# Patient Record
Sex: FEMALE | Race: OTHER | HISPANIC OR LATINO | Employment: FULL TIME | URBAN - METROPOLITAN AREA
[De-identification: names, ages, dates, MRNs, and addresses within clinical notes are randomized per-mention and may not be internally consistent; named-entity substitution may affect disease eponyms.]

---

## 2018-01-12 ENCOUNTER — OFFICE VISIT (OUTPATIENT)
Dept: URGENT CARE | Facility: MEDICAL CENTER | Age: 38
End: 2018-01-12
Payer: COMMERCIAL

## 2018-01-12 PROCEDURE — 99212 OFFICE O/P EST SF 10 MIN: CPT

## 2018-01-13 NOTE — RESULT NOTES
Verified Results  ([facility]) Pap Lb, HPV-hr [id number] 88:33UA Leatha Wilkinson   Source    Carolann Zafar Cervical  LMP / Prev Treat  Carolann Stephen QPT=[id number]  No  of containers  Carolann Stephen 01 CYTYC Thin Prep Vial     Test Name Result Flag Reference   DIAGNOSIS: Comment     NEGATIVE FOR INTRAEPITHELIAL LESION AND MALIGNANCY  THE CYTOLOGY PROCESSING WAS PERFORMED AT THE [facility] LOCATED AT  [place] [address], [address] [address]  Specimen adequacy: Comment     Satisfactory for evaluation  Endocervical and/or squamous metaplastic  cells (endocervical component) are present  Clinician provided ICD10: Comment     Z01 419   Performed by: Suhail Saha, Cytotechnologist (ASCP)     Carolann Stephen Note: Comment     The Pap smear is a screening test designed to aid in the detection of  premalignant and malignant conditions of the uterine cervix  It is not a  diagnostic procedure and should not be used as the sole means of detecting  cervical cancer  Both false-positive and false-negative reports do occur  HPV, high-risk Negative  Negative   This high-risk HPV test detects thirteen high-risk types  (16/18/31/33/35/39/45/51/52/56/58/59/68) without differentiation

## 2018-01-13 NOTE — PROGRESS NOTES
Assessment   1  Acute left otitis media (382 9) (G43 84)    Plan   Acute left otitis media    · Amoxicillin 875 MG Oral Tablet; TAKE 1 TABLET EVERY 12 HOURS DAILY   · Bromfed DM 30-2-10 MG/5ML Oral Syrup; SWALLOW 10 ML 3 times daily    Discussion/Summary   Discussion Summary:    Increase fluids, finish all antibiotic sent follow-up if symptoms increase or no better in 5 days  Medication Side Effects Reviewed: Possible side effects of new medications were reviewed with the patient/guardian today  Understands and agrees with treatment plan: The treatment plan was reviewed with the patient/guardian  The patient/guardian understands and agrees with the treatment plan    Counseling Documentation With Imm: The patient was counseled regarding instructions for management,-- prognosis,-- patient and family education,-- impressions  Chief Complaint   1  Ear Pain  Chief Complaint Free Text Note Form: Pt presents with 7/10 b/l ear pain  Began around 01/01/2018 when she also had a cold  History of Present Illness   HPI: Patient with 2 day history of bilateral ear pain that has been increasing  He also feels congested and has a postnasal drip  Hospital Based Practices Required Assessment:      Pain Assessment      the patient states they have pain  The pain is located in the b/l ears  (on a scale of 0 to 10, the patient rates the pain at 7 )      Abuse And Domestic Violence Screen       Yes, the patient is safe at home  -- The patient states no one is hurting them  Depression And Suicide Screen  No, the patient has not had thoughts of hurting themself  Prefered Language is  [place]  Primary Language is  English  Ear Pain: Sangeetha PETERSON presents with complaints of ear pain        Associated symptoms include otalgia,-- ear pressure-- and-- nasal congestion, but-- no ear plugging,-- no ear drainage,-- no decreased hearing,-- no auricular pain,-- no ear sores,-- no ear pruritus,-- no fever,-- no cough,-- no facial pain,-- no dental pain,-- no headache,-- no tinnitus,-- no vertigo,-- no loss of balance,-- no nausea-- and-- no temporomandibular joint pain  Review of Systems   ROS Reviewed:    ROS reviewed  Active Problems   1  Abdominal pain (789 00) (R10 9)   2  Abdominal spasms (789 00) (R10 9)   3  Dyspareunia (625 0)   4  Encounter for routine gynecological examination with Papanicolaou smear of cervix     (V72 31,V76 2) (Z01 419)   5  Urinary frequency (788 41) (R35 0)   6  Urinary urgency (788 63) (R39 15)   7  Vitiligo (709 01) (L80)    Past Medical History   1  History of Acute frontal sinusitis (461 1) (J01 10)   2  History of Acute otitis media, unspecified laterality   3  History of Carpal tunnel syndrome, unspecified laterality (354 0) (G56 00)   4  History of acute sinusitis (V12 69) (Z87 09)   5  History of gastroesophageal reflux (GERD) (V12 79) (Z87 19)   6  History of pregnancy (V13 29)   7  History of urinary frequency (V13 09) (Z87 898)   8  History of varicella (V12 09) (Z86 19)   9  History of Measles (Rubeola) (055 9)   10  History of Reported Positive Pap Smear (795 19)    Family History   Sister    1  Family history of Family Health Status Identical Twin Sister    Social History    · Denied: History of Alcohol Use (History)   · Caffeine Use   · Denied: History of Drug Use   ·    · Never A Smoker    Surgical History   1  History of  Section   2  History of Colposcopy   3  History of Oral Surgery Tooth Extraction   4  History of Tubal Ligation    Current Meds    1  Naproxen 500 MG Oral Tablet; Therapy: (Recorded:2016) to Recorded    Allergies   1   No Known Drug Allergies    Vitals   Signs   Recorded: [id number] 01:55PM   Temperature: 98 2 F, Tympanic  Heart Rate: 76  Respiration: 16  Systolic: 594  Diastolic: 82  Height: 6 ft   Weight: 127 lb   BMI Calculated: 17 22  BSA Calculated: 1 76  O2 Saturation: 100  Pain Scale: 7    Physical Exam        Constitutional      General appearance: No acute distress, well appearing and well nourished  Ears, Nose, Mouth, and Throat      External inspection of ears and nose: Normal        Otoscopic examination: Abnormal   The right tympanic membrane was bulging  The left tympanic membrane was red-- and-- was bulging  The right external canal was normal  The left external canal was normal       Nasal mucosa, septum, and turbinates: Normal without edema or erythema  Oropharynx: Normal with no erythema, edema, exudate or lesions  Pulmonary      Respiratory effort: No increased work of breathing or signs of respiratory distress  Auscultation of lungs: Clear to auscultation  Cardiovascular      Auscultation of heart: Normal rate and rhythm, normal S1 and S2, without murmurs  Lymphatic      Palpation of lymph nodes in neck: Abnormal   left anterior cervical node enlargement        Signatures    Electronically signed by : HANNAH Carlton; Jan 12 2018  2:03PM EST                       (Author)     Electronically signed by : CONCEPCIÓN Rodriguez ; Jan 12 2018  3:29PM EST                       (Co-author)

## 2018-01-17 NOTE — RESULT NOTES
Message   Call patient  Urinalysis and urine culture are negativive for urinary tract infection  Recommend urology evalution  I placed an order in EMR for urology consult        Verified Results  (1) URINE CULTURE 16Sep2016 09:41PM Vania Franco Order Number: [id number]     Test Name Result Flag Reference   CLINICAL REPORT (Report)     Test:        Urine culture  Specimen Type:   Urine  Specimen Date:   9/16/2016 9:41 PM  Result Date:    9/18/2016 5:36 PM  Result Status:   Final result  Resulting Lab:   [facility] [address] [place] [address]            Tel: [phone]      CULTURE                                       ------------------                                   20,000-29,000 cfu/ml Mixed Contaminants X4       Plan  Urinary frequency, Urinary urgency    · *1 - [facility] [address] [facility] Physician Referral  Consult  Status: Active  Requested  for: [id number]  Care Summary provided  : Yes

## 2018-01-17 NOTE — RESULT NOTES
Verified Results  US KIDNEY AND BLADDER WITH PVR [id number] 08:54AM Dylan Coleman     Test Name Result Flag Reference   US KIDNEY AND BLADDER WITH PVR (Report)     RENAL ULTRASOUND     INDICATION: 43-year-old female with urinary frequency and urgency  COMPARISON: None  TECHNIQUE:  Ultrasound of the retroperitoneum was performed with a curvilinear transducer utilizing volumetric sweeps and still imaging techniques  FINDINGS:     KIDNEYS:   Symmetric and normal size  Right kidney: 10 0 x 5 0 cm  Normal echogenicity and contour  No suspicious masses detected  No hydronephrosis  No shadowing calculi  No perinephric fluid collections  Left kidney: 10 6 x 5 5 cm  Normal echogenicity and contour  No suspicious masses detected  No hydronephrosis  No shadowing calculi  No perinephric fluid collections  URETERS:   Nonvisualized  BLADDER:    The bladder is underdistended for the study with a prevoid volume of 16 9 mL calculated  With voiding, there is minimal residual urine measuring 3 3 mL  IMPRESSION:       1  No hydronephrosis or abnormal cortical echogenicity  2  No significant postvoid residual volume          Workstation performed: [id number]     Signed by:   Tiff Laird MD   11/7/16

## 2018-01-23 VITALS
HEIGHT: 72 IN | SYSTOLIC BLOOD PRESSURE: 124 MMHG | BODY MASS INDEX: 17.2 KG/M2 | RESPIRATION RATE: 16 BRPM | OXYGEN SATURATION: 100 % | DIASTOLIC BLOOD PRESSURE: 82 MMHG | TEMPERATURE: 98.2 F | HEART RATE: 76 BPM | WEIGHT: 127 LBS

## 2018-03-13 ENCOUNTER — OFFICE VISIT (OUTPATIENT)
Dept: FAMILY MEDICINE CLINIC | Facility: CLINIC | Age: 38
End: 2018-03-13
Payer: COMMERCIAL

## 2018-03-13 VITALS
DIASTOLIC BLOOD PRESSURE: 78 MMHG | HEIGHT: 60 IN | BODY MASS INDEX: 26.62 KG/M2 | SYSTOLIC BLOOD PRESSURE: 106 MMHG | OXYGEN SATURATION: 98 % | RESPIRATION RATE: 16 BRPM | TEMPERATURE: 98.5 F | WEIGHT: 135.6 LBS | HEART RATE: 70 BPM

## 2018-03-13 DIAGNOSIS — J02.9 SORE THROAT: Primary | ICD-10-CM

## 2018-03-13 PROCEDURE — 99213 OFFICE O/P EST LOW 20 MIN: CPT | Performed by: FAMILY MEDICINE

## 2018-03-13 PROCEDURE — 3008F BODY MASS INDEX DOCD: CPT | Performed by: FAMILY MEDICINE

## 2018-03-13 PROCEDURE — 87070 CULTURE OTHR SPECIMN AEROBIC: CPT | Performed by: FAMILY MEDICINE

## 2018-03-13 RX ORDER — NAPROXEN 500 MG/1
1 TABLET ORAL AS NEEDED
COMMUNITY

## 2018-03-13 NOTE — PROGRESS NOTES
Assessment/Plan:    Sore throat  Patient presents with a scratchy throat for the last 2 days  Patient's daughter was diagnosed with Strep throat this week  Will check  throat culture  Patient was recommended to increase fluid intake, gargle with warm salt water, use throat lozenges  Call if symptoms persist or worsen  Diagnoses and all orders for this visit:    Sore throat  -     Throat culture; Future  -     Throat culture    Other orders  -     naproxen (NAPROSYN) 500 mg tablet; Take 1 tablet by mouth as needed        Subjective:      Patient ID: Garcia Light is a 40 y o  female  HPI     Patient presents to the office c/o sore throat/ scratchy throat for the last 2 days  Her daughter was diagnosed with Strep throat this week  Patient denies fever, chills  No difficulty swallowing,  No swollen lymph nodes  Denies tobacco use  The following portions of the patient's history were reviewed and updated as appropriate: allergies, current medications, past family history, past medical history, past social history, past surgical history and problem list     Review of Systems   Constitutional: Negative for activity change, appetite change, chills and fatigue  HENT: Positive for sore throat  Negative for congestion, ear pain, mouth sores, postnasal drip, trouble swallowing and voice change  Eyes: Negative for pain, discharge, redness, itching and visual disturbance  Respiratory: Negative for cough, chest tightness, shortness of breath and wheezing  Cardiovascular: Negative for palpitations and leg swelling  Gastrointestinal: Negative  Musculoskeletal: Negative for arthralgias, joint swelling and myalgias  Skin: Negative for rash and wound  Neurological: Negative for dizziness and headaches  Hematological: Negative  Psychiatric/Behavioral: Negative            Objective:      /78 (BP Location: Left arm, Patient Position: Sitting, Cuff Size: Adult)   Pulse 70   Temp 98 5 °F (36 9 °C) (Tympanic)   Resp 16   Ht 5' (1 524 m)   Wt 61 5 kg (135 lb 9 6 oz)   SpO2 98%   BMI 26 48 kg/m²        Physical Exam   Constitutional: She appears well-developed and well-nourished  HENT:   Head: Normocephalic and atraumatic  Right Ear: External ear normal    Left Ear: External ear normal    Nose: Nose normal    Pharynx is erythematous  No exudate  Eyes: Conjunctivae are normal  Pupils are equal, round, and reactive to light  Neck: Normal range of motion  Neck supple  Cardiovascular: Normal rate, regular rhythm and normal heart sounds  No murmur heard  Pulmonary/Chest: Effort normal and breath sounds normal  She has no wheezes  She has no rales  Abdominal: Soft  Bowel sounds are normal  There is no tenderness  Musculoskeletal: Normal range of motion  She exhibits no edema, tenderness or deformity  Lymphadenopathy:     She has no cervical adenopathy  Skin: Skin is warm and dry  No rash noted  Psychiatric: She has a normal mood and affect  Nursing note and vitals reviewed

## 2018-03-13 NOTE — ASSESSMENT & PLAN NOTE
Patient presents with a scratchy throat for the last 2 days  Patient's daughter was diagnosed with Strep throat this week  Will check  throat culture  Patient was recommended to increase fluid intake, gargle with warm salt water, use throat lozenges  Call if symptoms persist or worsen

## 2018-03-17 LAB — BACTERIA THROAT CULT: NORMAL

## 2018-04-23 ENCOUNTER — ANNUAL EXAM (OUTPATIENT)
Dept: GYNECOLOGY | Facility: CLINIC | Age: 38
End: 2018-04-23
Payer: COMMERCIAL

## 2018-04-23 VITALS
DIASTOLIC BLOOD PRESSURE: 60 MMHG | HEIGHT: 59 IN | WEIGHT: 130.6 LBS | SYSTOLIC BLOOD PRESSURE: 90 MMHG | BODY MASS INDEX: 26.33 KG/M2

## 2018-04-23 DIAGNOSIS — R10.2 PELVIC CRAMPING: ICD-10-CM

## 2018-04-23 DIAGNOSIS — Z01.411 ENCOUNTER FOR GYNECOLOGICAL EXAMINATION WITH ABNORMAL FINDING: Primary | ICD-10-CM

## 2018-04-23 PROBLEM — Z01.419 VISIT FOR GYNECOLOGIC EXAMINATION: Status: ACTIVE | Noted: 2018-04-23

## 2018-04-23 PROCEDURE — S0612 ANNUAL GYNECOLOGICAL EXAMINA: HCPCS | Performed by: NURSE PRACTITIONER

## 2018-04-23 NOTE — PROGRESS NOTES
Subjective      Butch Gutierrez is a 45 y o  female who presents for her annual exam  Periods are regular every 28-30 days, lasting 6 days  Dysmenorrhea:moderate, occurring first 1-2 days of flow  Cyclic symptoms include pelvic pain  No intermenstrual bleeding, spotting, or discharge  The pt  relates that over the past year she has experienced more pelvic cramping with her periods  She does use Naprosyn but does not always get complete relief  The cramping usually occurs during the 1st 2 days of her period  She has also noticed more cramping with intercourse recenty too  The pt  relates that she was told in the past that she has endometriosis  Current contraception: tubal ligation  History of abnormal Pap smear: yes -  - had colposcopy  Family history of breast cancer: no  Past Medical History:   Diagnosis Date    Carpal tunnel syndrome     GERD (gastroesophageal reflux disease)     Measles     Varicella      History reviewed  No pertinent family history  Past Surgical History:   Procedure Laterality Date     SECTION      COLPOSCOPY      TOOTH EXTRACTION      TUBAL LIGATION       Social History     Social History    Marital status: /Civil Union     Spouse name: N/A    Number of children: N/A    Years of education: N/A     Occupational History    Not on file       Social History Main Topics    Smoking status: Never Smoker    Smokeless tobacco: Never Used    Alcohol use No    Drug use: No    Sexual activity: Yes     Partners: Male     Birth control/ protection: Female Sterilization     Other Topics Concern    Not on file     Social History Narrative    No narrative on file       Current Outpatient Prescriptions:     naproxen (NAPROSYN) 500 mg tablet, Take 1 tablet by mouth as needed, Disp: , Rfl:       Review of Systems  Constitutional :no fever, feels well, no tiredness, no recent weight gain or loss  Cardiovascular: no complaints of slow or fast heart beat, no chest pain, no palpitations  Respiratory: no complaints of shortness of shortness of breath, no MENDOZA  Breasts:no complaints of breast pain, breast lump, or nipple discharge  Gastrointestinal: no complaints of abdominal pain, constipation,nause, vomiting, or diarrhea or bloody stools  Genitourinary : no complaints of dysuria, incontinence,vaginal discharge or abnormal vaginal bleeding  Positive for pelvic cramping /dysmenonorrhea as noted in HPI  Integumentary: no complaints of skin rash or lesion,itching or dry skin  Neurological: no complaints of headache,numbness, tingling, dizziness or fainting       Objective      BP 90/60 (BP Location: Left arm, Patient Position: Sitting, Cuff Size: Standard)   Ht 4' 11 45" (1 51 m)   Wt 59 2 kg (130 lb 9 6 oz)   LMP 04/12/2018 (Exact Date)   BMI 25 98 kg/m²     General:   appears stated age","cooperative","alert" normal mood and affect   Neck: Neck: normal, supple,trachea midline, no masses   Heart: regular rate and rhythm, S1, S2 normal, no murmur, click, rub or gallop   Lungs: clear to auscultation bilaterally   Breasts: Breast exam :normal, no dimpling or skin changes noted   Abdomen: soft, non-tender, without masses or organomegaly   Vulva: Normal , no lesions   Vagina: normal , no lesions or dryness   Urethra: normal   Cervix: Normal, no palpable masses A pap smear was not done   Uterus: Normal ,not enlarged,no palpable masses  Pt  reports pelvic cramping/tenderness with palpation  Adnexa: Normal, non-tender without fullness or masses                         Assessment   GYN exam with pelvic cramping/discomfort        Plan      All questions answered  Breast self exam technique reviewed and patient encouraged to perform self-exam monthly  Dietary diary  Follow up as needed  Pelvic ultrasound  We discussed her dysmenorrhea with her menses and the fact that it seems to be more bothersome recently despite the use of Naprosyn  Since she did have some discomfort during the pelvic exam she does agree to have a pelvic US done for further evaluation  We also discussed the possibility of using OC's to help control the discomfort and how this would be helpful lucio  if it is endometriosis  The pt  declines to use any hormonal BC method  She will continue with the use of Naprosyn or IBU  We will await the results of her pelvic US

## 2018-05-07 ENCOUNTER — HOSPITAL ENCOUNTER (OUTPATIENT)
Dept: ULTRASOUND IMAGING | Facility: MEDICAL CENTER | Age: 38
Discharge: HOME | End: 2018-05-07
Payer: COMMERCIAL

## 2018-05-07 DIAGNOSIS — R10.2 PELVIC CRAMPING: ICD-10-CM

## 2018-05-07 PROCEDURE — 76856 US EXAM PELVIC COMPLETE: CPT

## 2018-05-07 PROCEDURE — 76830 TRANSVAGINAL US NON-OB: CPT

## 2018-06-20 DIAGNOSIS — N83.209 CYST OF OVARY, UNSPECIFIED LATERALITY: Primary | ICD-10-CM

## 2019-01-17 ENCOUNTER — OFFICE VISIT (OUTPATIENT)
Dept: URGENT CARE | Facility: MEDICAL CENTER | Age: 39
End: 2019-01-17
Payer: COMMERCIAL

## 2019-01-17 VITALS
OXYGEN SATURATION: 96 % | HEIGHT: 60 IN | SYSTOLIC BLOOD PRESSURE: 99 MMHG | BODY MASS INDEX: 25.52 KG/M2 | DIASTOLIC BLOOD PRESSURE: 67 MMHG | RESPIRATION RATE: 16 BRPM | WEIGHT: 130 LBS | TEMPERATURE: 98.1 F | HEART RATE: 92 BPM

## 2019-01-17 DIAGNOSIS — J06.9 ACUTE URI: Primary | ICD-10-CM

## 2019-01-17 PROCEDURE — 99213 OFFICE O/P EST LOW 20 MIN: CPT | Performed by: FAMILY MEDICINE

## 2019-01-17 RX ORDER — ALBUTEROL SULFATE 90 UG/1
2 AEROSOL, METERED RESPIRATORY (INHALATION) EVERY 6 HOURS PRN
Qty: 1 INHALER | Refills: 0 | Status: SHIPPED | OUTPATIENT
Start: 2019-01-17

## 2019-01-17 RX ORDER — BENZONATATE 100 MG/1
100 CAPSULE ORAL 3 TIMES DAILY PRN
Qty: 20 CAPSULE | Refills: 0 | Status: SHIPPED | OUTPATIENT
Start: 2019-01-17

## 2019-01-17 NOTE — PROGRESS NOTES
[facility]        NAME: Timothy Garcia is a 45 y o  female  : 1980    MRN: [id number]  DATE: 2019  TIME: 12:28 PM    Assessment and Plan   Acute URI [J06 9]  1  Acute URI  benzonatate (TESSALON PERLES) 100 mg capsule    albuterol (PROVENTIL HFA,VENTOLIN HFA) 90 mcg/act inhaler         Patient Instructions       Follow up with PCP in 3-5 days  Proceed to  ER if symptoms worsen  Chief Complaint     Chief Complaint   Patient presents with    Flu Symptoms     eye pains, chest congestion, body aches sore throat since tuesday night         History of Present Illness       Patient with URI symptoms for the last 2 days  Consisting of nasal congestion mild to moderate some postnasal drip  Cough seems to be worse at night  Complaining pleuritic chest pain every time she coughs  She has been taking naproxen for symptoms with mild improvement  Complaining of sore throat and postnasal drip  Describes body aches however seem to improve after she took some naproxen  Denies history of asthma or shortness of breath  Review of Systems   Review of Systems   Constitutional: Negative  HENT: Positive for congestion and sinus pressure  Respiratory: Positive for cough  Cardiovascular: Positive for chest pain  Neurological: Positive for headaches           Current Medications       Current Outpatient Prescriptions:     albuterol (PROVENTIL HFA,VENTOLIN HFA) 90 mcg/act inhaler, Inhale 2 puffs every 6 (six) hours as needed for wheezing, Disp: 1 Inhaler, Rfl: 0    benzonatate (TESSALON PERLES) 100 mg capsule, Take 1 capsule (100 mg total) by mouth 3 (three) times a day as needed for cough, Disp: 20 capsule, Rfl: 0    naproxen (NAPROSYN) 500 mg tablet, Take 1 tablet by mouth as needed, Disp: , Rfl:     Current Allergies     Allergies as of 2019    (No Known Allergies)            The following portions of the patient's history were reviewed and updated as appropriate: allergies, current medications, past family history, past medical history, past social history, past surgical history and problem list      Past Medical History:   Diagnosis Date    Carpal tunnel syndrome     GERD (gastroesophageal reflux disease)     Measles     Varicella        Past Surgical History:   Procedure Laterality Date     SECTION      COLPOSCOPY      TOOTH EXTRACTION      TUBAL LIGATION         No family history on file  Medications have been verified  Objective   BP 99/67   Pulse 92   Temp 98 1 °F (36 7 °C) (Tympanic)   Resp 16   Ht 5' (1 524 m)   Wt 59 kg (130 lb)   LMP 2019   SpO2 96%   BMI 25 39 kg/m²        Physical Exam     Physical Exam   Constitutional: She appears well-developed and well-nourished  HENT:   Hypertrophic turbinates  Cobblestoning observed the posterior pharynx  Neck: Normal range of motion  Neck supple  Cardiovascular: Normal rate and regular rhythm      Pulmonary/Chest: Effort normal and breath sounds normal

## 2019-01-17 NOTE — PATIENT INSTRUCTIONS
I prescribed Tessalon Perles 100 mg q 8 hours for cough  I prescribed Ventolin inhaler to use if patient becomes short of breath or wheezing  Advised patient to gargle with salt water  Recommended patient take Tylenol on naproxen that she has been for body aches  Follow up with primary care provider symptoms worsen  Upper Respiratory Infection   WHAT YOU NEED TO KNOW:   An upper respiratory infection is also called the common cold  It is an infection that can affect your nose, throat, ears, and sinuses  For healthy people, the common cold is usually not serious and does not need special treatment  Cold symptoms are usually worst for the first 3 to 5 days  Most people get better in 7 to 14 days  You may continue to cough for 2 to 3 weeks  Colds are caused by viruses and do not get better with antibiotics  DISCHARGE INSTRUCTIONS:   Return to the emergency department if:   · You have chest pain or trouble breathing  Contact your healthcare provider if:   · You have a fever over 102ºF (39°C)  · Your sore throat gets worse or you see white or yellow spots in your throat  · Your symptoms get worse after 3 to 5 days or your cold is not better in 14 days  · You have a rash anywhere on your skin  · You have large, tender lumps in your neck  · You have thick, green or yellow drainage from your nose  · You cough up thick yellow, green, or bloody mucus  · You have vomiting for more than 24 hours and cannot keep fluids down  · You have a bad earache  · You have questions or concerns about your condition or care  Medicines: You may need any of the following:  · Decongestants  help reduce nasal congestion and help you breathe more easily  If you take decongestant pills, they may make you feel restless or cause problems with your sleep  Do not use decongestant sprays for more than a few days  · Cough suppressants  help reduce coughing   Ask your healthcare provider which type of cough medicine is best for you  · NSAIDs , such as ibuprofen, help decrease swelling, pain, and fever  NSAIDs can cause stomach bleeding or kidney problems in certain people  If you take blood thinner medicine, always ask your healthcare provider if NSAIDs are safe for you  Always read the medicine label and follow directions  · Acetaminophen  decreases pain and fever  It is available without a doctor's order  Ask how much to take and how often to take it  Follow directions  Read the labels of all other medicines you are using to see if they also contain acetaminophen, or ask your doctor or pharmacist  Acetaminophen can cause liver damage if not taken correctly  Do not use more than 4 grams (4,000 milligrams) total of acetaminophen in one day  · Take your medicine as directed  Contact your healthcare provider if you think your medicine is not helping or if you have side effects  Tell him or her if you are allergic to any medicine  Keep a list of the medicines, vitamins, and herbs you take  Include the amounts, and when and why you take them  Bring the list or the pill bottles to follow-up visits  Carry your medicine list with you in case of an emergency  Follow up with your healthcare provider as directed:  Write down your questions so you remember to ask them during your visits  Self-care:   · Rest as much as possible  Slowly start to do more each day  · Drink more liquids as directed  Liquids will help thin and loosen mucus so you can cough it up  Liquids will also help prevent dehydration  Liquids that help prevent dehydration include water, fruit juice, and broth  Do not drink liquids that contain caffeine  Caffeine can increase your risk for dehydration  Ask your healthcare provider how much liquid to drink each day  · Soothe a sore throat  Gargle with warm salt water  This helps your sore throat feel better  Make salt water by dissolving ¼ teaspoon salt in 1 cup warm water   You may also suck on hard candy or throat lozenges  You may use a sore throat spray  · Use a humidifier or vaporizer  Use a cool mist humidifier or a vaporizer to increase air moisture in your home  This may make it easier for you to breathe and help decrease your cough  · Use saline nasal drops as directed  These help relieve congestion  · Apply petroleum-based jelly around the outside of your nostrils  This can decrease irritation from blowing your nose  · Do not smoke  Nicotine and other chemicals in cigarettes and cigars can make your symptoms worse  They can also cause infections such as bronchitis or pneumonia  Ask your healthcare provider for information if you currently smoke and need help to quit  E-cigarettes or smokeless tobacco still contain nicotine  Talk to your healthcare provider before you use these products  Prevent spreading your cold to others:   · Try to stay away from other people during the first 2 to 3 days of your cold when it is more easily spread  · Do not share food or drinks  · Do not share hand towels with household members  · Wash your hands often, especially after you blow your nose  Turn away from other people and cover your mouth and nose with a tissue when you sneeze or cough  © 2017 [employer]  Information is for End User's use only and may not be sold, redistributed or otherwise used for commercial purposes  All illustrations and images included in CareNotes® are the copyrighted property of [employer]  or [employer]  The above information is an  only  It is not intended as medical advice for individual conditions or treatments  Talk to your doctor, nurse or pharmacist before following any medical regimen to see if it is safe and effective for you

## 2019-11-22 ENCOUNTER — OFFICE VISIT (OUTPATIENT)
Dept: URGENT CARE | Facility: CLINIC | Age: 39
End: 2019-11-22
Payer: COMMERCIAL

## 2019-11-22 VITALS
HEIGHT: 60 IN | TEMPERATURE: 98.9 F | BODY MASS INDEX: 27.68 KG/M2 | RESPIRATION RATE: 16 BRPM | SYSTOLIC BLOOD PRESSURE: 98 MMHG | WEIGHT: 141 LBS | HEART RATE: 94 BPM | DIASTOLIC BLOOD PRESSURE: 70 MMHG | OXYGEN SATURATION: 97 %

## 2019-11-22 DIAGNOSIS — M54.50 LOW BACK PAIN, UNSPECIFIED BACK PAIN LATERALITY, UNSPECIFIED CHRONICITY, UNSPECIFIED WHETHER SCIATICA PRESENT: Primary | ICD-10-CM

## 2019-11-22 DIAGNOSIS — R10.30 LOWER ABDOMINAL PAIN: ICD-10-CM

## 2019-11-22 DIAGNOSIS — N39.0 URINARY TRACT INFECTION WITHOUT HEMATURIA, SITE UNSPECIFIED: ICD-10-CM

## 2019-11-22 LAB
SL AMB  POCT GLUCOSE, UA: NEGATIVE
SL AMB LEUKOCYTE ESTERASE,UA: ABNORMAL
SL AMB POCT BILIRUBIN,UA: NEGATIVE
SL AMB POCT BLOOD,UA: NEGATIVE
SL AMB POCT CLARITY,UA: ABNORMAL
SL AMB POCT COLOR,UA: YELLOW
SL AMB POCT KETONES,UA: NEGATIVE
SL AMB POCT NITRITE,UA: NEGATIVE
SL AMB POCT PH,UA: 7.5
SL AMB POCT SPECIFIC GRAVITY,UA: 1.01
SL AMB POCT URINE PROTEIN: NEGATIVE
SL AMB POCT UROBILINOGEN: NEGATIVE

## 2019-11-22 PROCEDURE — 99213 OFFICE O/P EST LOW 20 MIN: CPT | Performed by: PHYSICIAN ASSISTANT

## 2019-11-22 PROCEDURE — 87086 URINE CULTURE/COLONY COUNT: CPT | Performed by: PHYSICIAN ASSISTANT

## 2019-11-22 PROCEDURE — 81002 URINALYSIS NONAUTO W/O SCOPE: CPT | Performed by: PHYSICIAN ASSISTANT

## 2019-11-22 RX ORDER — SULFAMETHOXAZOLE AND TRIMETHOPRIM 800; 160 MG/1; MG/1
1 TABLET ORAL EVERY 12 HOURS SCHEDULED
Qty: 14 TABLET | Refills: 0 | Status: SHIPPED | OUTPATIENT
Start: 2019-11-22 | End: 2019-11-29

## 2019-11-22 NOTE — PATIENT INSTRUCTIONS
Your being treated for possible urinary tract infection  Take antibiotic as instructed  Other possibilities of your pain may come from female organs  Please follow-up with your gynecologist at her appointment next week  We do not do pelvic exams in the care now setting  Your urine will be sent for culture  Results take approximately 72 hours to get back  This provider will be in the office Saturday, Sunday and Tuesday and will more than likely call you with the results on Tuesday  If you do not hear from the provider by mid day on Tuesday please call phone number atop of clinical summary to request your results  Push fluids  You may safely take Tylenol as needed for pain  Warm compresses to back and lower abdomen may be soothing  If significant worsening of your pain, please proceed to emergency room for further evaluation

## 2019-11-22 NOTE — PROGRESS NOTES
[facility]    NAME: Claudy Sanders is a 44 y o  female  : 1980    MRN: [id number]  DATE: 2019  TIME: 2:48 PM    Assessment and Plan   Low back pain, unspecified back pain laterality, unspecified chronicity, unspecified whether sciatica present [M54 5]  1  Low back pain, unspecified back pain laterality, unspecified chronicity, unspecified whether sciatica present  POCT urine dip   2  Lower abdominal pain     3  Urinary tract infection without hematuria, site unspecified  sulfamethoxazole-trimethoprim (BACTRIM DS) 800-160 mg per tablet       Patient Instructions     Patient Instructions   Your being treated for possible urinary tract infection  Take antibiotic as instructed  Other possibilities of your pain may come from female organs  Please follow-up with your gynecologist at her appointment next week  We do not do pelvic exams in the care now setting  Your urine will be sent for culture  Results take approximately 72 hours to get back  This provider will be in the office Saturday,  and Tuesday and will more than likely call you with the results on Tuesday  If you do not hear from the provider by mid day on Tuesday please call phone number atop of clinical summary to request your results  Push fluids  You may safely take Tylenol as needed for pain  Warm compresses to back and lower abdomen may be soothing  If significant worsening of your pain, please proceed to emergency room for further evaluation  Chief Complaint     Chief Complaint   Patient presents with    Possible UTI     low back pain, pelvic pain; pain with urination x 1 week       History of Present Illness   Claudy Sanders presents to the clinic c/o  58-year-old female with low back pain that started last Saturday  She has also noticed some suprapubic pain and pressure  No criselda dysuria but notes increased frequency, post void sensation that needs to urinate again    She does have a little bit of increased vaginal discharge  No overt vaginal pain  Denies history of any prior pelvic infections  Hx   Hx tubal ligation  Last menstrual period approximately 3 weeks ago  Typically has  Every month  Has appointment with her gynecologist this next Wednesday  Has not been taking anything for pain  Denies fever or chills  No nausea or vomiting  Reports normal appetite  Pain is worse if she is up and walking around  She feels best if she is laying in bed  History of chronic back pain issues  Review of Systems   Review of Systems   Constitutional: Positive for activity change  Negative for appetite change, chills, fatigue and fever  Respiratory: Negative  Cardiovascular: Negative  Gastrointestinal: Positive for abdominal pain  Negative for abdominal distention, anal bleeding, blood in stool, constipation, diarrhea, nausea, rectal pain and vomiting  Genitourinary: Positive for difficulty urinating, frequency, pelvic pain and urgency  Negative for dyspareunia, dysuria, flank pain, hematuria, vaginal bleeding, vaginal discharge and vaginal pain  Mild vaginal discharge  Reports that she sometimes gets increased discharge before menstrual cycle  Musculoskeletal: Positive for back pain         Current Medications     Long-Term Medications   Medication Sig Dispense Refill    naproxen (NAPROSYN) 500 mg tablet Take 1 tablet by mouth as needed         Current Allergies     Allergies as of 2019    (No Known Allergies)          The following portions of the patient's history were reviewed and updated as appropriate: allergies, current medications, past family history, past medical history, past social history, past surgical history and problem list   Past Medical History:   Diagnosis Date    Carpal tunnel syndrome     GERD (gastroesophageal reflux disease)     Measles     Varicella      Past Surgical History:   Procedure Laterality Date     SECTION      COLPOSCOPY      TOOTH EXTRACTION      TUBAL LIGATION       History reviewed  No pertinent family history  Objective   BP 98/70   Pulse 94   Temp 98 9 °F (37 2 °C) (Tympanic)   Resp 16   Ht 5' (1 524 m)   Wt 64 kg (141 lb)   SpO2 97%   BMI 27 54 kg/m²   No LMP recorded  Physical Exam     Physical Exam   Constitutional: She is oriented to person, place, and time  She appears well-developed and well-nourished  No distress  Appears mildly uncomfortable  Standing in exam room  Able to get up and down off of table without difficulty  Cardiovascular: Normal rate, regular rhythm and normal heart sounds  Exam reveals no gallop and no friction rub  No murmur heard  Pulmonary/Chest: Effort normal and breath sounds normal  No stridor  No respiratory distress  She has no wheezes  She has no rales  Abdominal: Soft  Bowel sounds are normal  She exhibits no distension and no mass  There is no hepatosplenomegaly  There is tenderness in the suprapubic area  There is no rebound, no guarding, no tenderness at McBurney's point and negative Stubbs's sign  Hernia confirmed negative in the ventral area  No CVAT  Neurological: She is alert and oriented to person, place, and time  Skin: Skin is warm and dry  She is not diaphoretic  No acute rashes  Psychiatric: She has a normal mood and affect  Nursing note and vitals reviewed

## 2019-11-24 ENCOUNTER — TELEPHONE (OUTPATIENT)
Dept: URGENT CARE | Facility: CLINIC | Age: 39
End: 2019-11-24

## 2019-11-24 LAB — BACTERIA UR CULT: NORMAL

## 2019-11-24 NOTE — TELEPHONE ENCOUNTER
Message left that urine culture did not show any bacterial infection  Please follow up with her gynecologist as previously discussed

## 2019-11-27 ENCOUNTER — ANNUAL EXAM (OUTPATIENT)
Dept: GYNECOLOGY | Facility: CLINIC | Age: 39
End: 2019-11-27
Payer: COMMERCIAL

## 2019-11-27 VITALS
HEART RATE: 79 BPM | WEIGHT: 138 LBS | SYSTOLIC BLOOD PRESSURE: 98 MMHG | BODY MASS INDEX: 27.09 KG/M2 | HEIGHT: 60 IN | DIASTOLIC BLOOD PRESSURE: 70 MMHG

## 2019-11-27 DIAGNOSIS — R10.2 PELVIC PAIN: ICD-10-CM

## 2019-11-27 DIAGNOSIS — N92.0 MENORRHAGIA WITH REGULAR CYCLE: ICD-10-CM

## 2019-11-27 DIAGNOSIS — Z12.31 ENCOUNTER FOR SCREENING MAMMOGRAM FOR MALIGNANT NEOPLASM OF BREAST: ICD-10-CM

## 2019-11-27 DIAGNOSIS — Z01.419 ENCOUNTER FOR GYNECOLOGICAL EXAMINATION WITH PAPANICOLAOU SMEAR OF CERVIX: ICD-10-CM

## 2019-11-27 DIAGNOSIS — Z01.411 ENCOUNTER FOR GYNECOLOGICAL EXAMINATION (GENERAL) (ROUTINE) WITH ABNORMAL FINDINGS: Primary | ICD-10-CM

## 2019-11-27 PROCEDURE — S0612 ANNUAL GYNECOLOGICAL EXAMINA: HCPCS | Performed by: OBSTETRICS & GYNECOLOGY

## 2019-11-27 PROCEDURE — G0145 SCR C/V CYTO,THINLAYER,RESCR: HCPCS | Performed by: OBSTETRICS & GYNECOLOGY

## 2019-11-27 PROCEDURE — 87624 HPV HI-RISK TYP POOLED RSLT: CPT | Performed by: OBSTETRICS & GYNECOLOGY

## 2019-11-27 RX ORDER — MULTIVIT-MIN/IRON/FOLIC ACID/K 18-600-40
CAPSULE ORAL
COMMUNITY

## 2019-11-27 RX ORDER — PHENOL 1.4 %
600 AEROSOL, SPRAY (ML) MUCOUS MEMBRANE 2 TIMES DAILY WITH MEALS
COMMUNITY

## 2019-11-27 RX ORDER — DIPHENOXYLATE HYDROCHLORIDE AND ATROPINE SULFATE 2.5; .025 MG/1; MG/1
1 TABLET ORAL DAILY
COMMUNITY

## 2019-11-27 RX ORDER — UBIDECARENONE 75 MG
CAPSULE ORAL DAILY
COMMUNITY

## 2019-11-27 RX ORDER — RIBOFLAVIN (VITAMIN B2) 100 MG
100 TABLET ORAL DAILY
COMMUNITY

## 2019-11-27 RX ORDER — MAGNESIUM 30 MG
30 TABLET ORAL 2 TIMES DAILY
COMMUNITY

## 2019-11-27 NOTE — PATIENT INSTRUCTIONS
Pelvic pain/menorrhagia - will RTO for TVS possible SIS/EMB  Discussed endometriosis/ovarian cysts at length  Questions answered  Advised monthly SBE, annual CBE and baseline screening mammography at age 36  Script given  Reviewed ASCCP guidelines and recommended pap with cotesting q3 yrs for this low risk patient  RTO in one year or sooner PRN

## 2019-11-27 NOTE — PROGRESS NOTES
Assessment/Plan:    Pelvic pain/menorrhagia - will RTO for TVS possible SIS/EMB  Discussed endometriosis/ovarian cysts at length  Questions answered  Advised monthly SBE, annual CBE and baseline screening mammography at age 36  Script given  Reviewed ASCCP guidelines and recommended pap with cotesting q3 yrs for this low risk patient  RTO in one year or sooner PRN  Diagnoses and all orders for this visit:    Encounter for gynecological examination (general) (routine) with abnormal findings    Encounter for screening mammogram for malignant neoplasm of breast  -     Mammo screening bilateral w 3d & cad; Future    Pelvic pain    Menorrhagia with regular cycle              Subjective:      Patient ID: Shaggy Barajas is a 44 y o  female  This patient presents for routine annual gyn exam    Patient has concerns with continued pelvic pain  She had a TVU in 5/2018 secondary to similar concerns at last AE and it revealed a 0 9 cm anechoic lesion as well as corpus luteal cyst with recommendation for repeat U/S in 6-8 weeks which patient does not remember having  She states she was diagnosed with endometriosis during her C/S and has questions about treatment  She does not wish to start hormones and is questioning a hysterectomy  Today she reports pelvic discomfort diffusely throughout pelvis, unable to rate on pain scale stating it was "just uncomfortable " Denies dysuria  Has a hx of chronic constipation since childhood that she treats with diet  Menses are regular and heavy for 7 days with pain that she treats with naprosyn for the first 3 days  Pap/HPV testing up to date and normal, 2016   and monogamous  She denies STI concerns  BTL for contraception  Did notice that painful periods started after T L            The following portions of the patient's history were reviewed and updated as appropriate: allergies, current medications, past family history, past medical history, past social history, past surgical history and problem list     Review of Systems   Constitutional: Negative  HENT: Negative  Respiratory: Negative  Cardiovascular: Negative  Gastrointestinal: Negative  Endocrine: Negative  Genitourinary: Positive for menstrual problem and pelvic pain  Negative for difficulty urinating, dysuria, frequency, urgency, vaginal bleeding, vaginal discharge and vaginal pain  Musculoskeletal: Negative  Skin: Negative  Neurological: Negative  Psychiatric/Behavioral: Negative  Objective:      BP 98/70   Pulse 79   Ht 5' (1 524 m)   Wt 62 6 kg (138 lb)   LMP 11/04/2019   BMI 26 95 kg/m²          Physical Exam   Constitutional: She is oriented to person, place, and time  She appears well-developed and well-nourished  She is cooperative  HENT:   Head: Normocephalic and atraumatic  Neck: Normal range of motion  Neck supple  No thyroid mass and no thyromegaly present  Cardiovascular: Normal rate, regular rhythm and normal heart sounds  Pulmonary/Chest: Effort normal and breath sounds normal  Right breast exhibits no inverted nipple, no mass, no nipple discharge, no skin change and no tenderness  Left breast exhibits no inverted nipple, no mass, no nipple discharge, no skin change and no tenderness  No breast tenderness or discharge  Breasts are symmetrical    Abdominal: Soft  Normal appearance and bowel sounds are normal  There is no hepatosplenomegaly  There is no tenderness  Hernia confirmed negative in the right inguinal area and confirmed negative in the left inguinal area  Genitourinary: Vagina normal and uterus normal  Rectal exam shows no external hemorrhoid  No breast tenderness or discharge  Pelvic exam was performed with patient supine  No labial fusion  There is no rash, tenderness, lesion or injury on the right labia  There is no rash, tenderness, lesion or injury on the left labia  Uterus is not deviated, not enlarged, not fixed and not tender   Cervix exhibits no motion tenderness, no discharge and no friability  Right adnexum displays no mass, no tenderness and no fullness  Left adnexum displays no mass, no tenderness and no fullness  No erythema, tenderness or bleeding in the vagina  No signs of injury around the vagina  No vaginal discharge found  Genitourinary Comments: Urethra normal without lesions  No bladder tenderness   Musculoskeletal: Normal range of motion  Lymphadenopathy:     She has no axillary adenopathy  No inguinal adenopathy noted on the right or left side  Right: No inguinal adenopathy present  Left: No inguinal adenopathy present  Neurological: She is alert and oriented to person, place, and time  Skin: Skin is warm, dry and intact  Psychiatric: She has a normal mood and affect  Her speech is normal and behavior is normal  Cognition and memory are normal    Nursing note and vitals reviewed

## 2019-12-01 LAB
HPV HR 12 DNA CVX QL NAA+PROBE: NEGATIVE
HPV16 DNA CVX QL NAA+PROBE: NEGATIVE
HPV18 DNA CVX QL NAA+PROBE: NEGATIVE

## 2019-12-03 LAB
LAB AP GYN PRIMARY INTERPRETATION: NORMAL
Lab: NORMAL

## 2019-12-11 ENCOUNTER — ULTRASOUND (OUTPATIENT)
Dept: GYNECOLOGY | Facility: CLINIC | Age: 39
End: 2019-12-11
Payer: COMMERCIAL

## 2019-12-11 ENCOUNTER — OFFICE VISIT (OUTPATIENT)
Dept: GYNECOLOGY | Facility: CLINIC | Age: 39
End: 2019-12-11
Payer: COMMERCIAL

## 2019-12-11 DIAGNOSIS — N80.0 ADENOMYOSIS: ICD-10-CM

## 2019-12-11 DIAGNOSIS — N92.0 MENORRHAGIA WITH REGULAR CYCLE: Primary | ICD-10-CM

## 2019-12-11 DIAGNOSIS — N94.6 DYSMENORRHEA: ICD-10-CM

## 2019-12-11 PROCEDURE — 58100 BIOPSY OF UTERUS LINING: CPT | Performed by: OBSTETRICS & GYNECOLOGY

## 2019-12-11 PROCEDURE — 88305 TISSUE EXAM BY PATHOLOGIST: CPT | Performed by: PATHOLOGY

## 2019-12-11 PROCEDURE — 76831 ECHO EXAM UTERUS: CPT | Performed by: OBSTETRICS & GYNECOLOGY

## 2019-12-11 PROCEDURE — 58340 CATHETER FOR HYSTEROGRAPHY: CPT | Performed by: OBSTETRICS & GYNECOLOGY

## 2019-12-11 PROCEDURE — 76830 TRANSVAGINAL US NON-OB: CPT | Performed by: OBSTETRICS & GYNECOLOGY

## 2019-12-11 PROCEDURE — 99214 OFFICE O/P EST MOD 30 MIN: CPT | Performed by: OBSTETRICS & GYNECOLOGY

## 2019-12-11 NOTE — PROGRESS NOTES
Assessment/Plan:    Reviewed transvaginal scan/SIS along with evidence suggestive of adenomyosis  Discussed the treatment options for adenomyosis including following versus medical management versus surgical management  Discussed supracervical versus total laparoscopic hysterectomy both with bilateral salpingectomy along with risks of surgery including but not limited to infection, hemorrhage, bowel, bladder, vascular, ureteral injury, possible necessity for laparotomy  Discussed the endometrial ablation  With evidence suggestive of adenomyosis I suspect the tube ablation will not be successful but we did discuss this as an option    30 minutes discussing options and coordinating care     Diagnoses and all orders for this visit:    Menorrhagia with regular cycle    Dysmenorrhea    Adenomyosis        Subjective:      Patient ID: Wiliam Gabriel is a 44 y o  female  HPI  G 1 P 1 C section with tubal with menorrhagia, dysmenorrhea, lower back pain and pelvic pressure  TVS/SIS:   Procedure Orders   1  Endometrial biopsy [[id number]] ordered by Gale Jones at 12/11/19 0752    2  Sonohysterogram [[id number]] ordered by Gale Jones at 12/11/19 0751    3  AMB US Pelvic Non OB [[id number]] ordered by Gale Jones at 12/11/19 0750    Post-procedure Diagnoses   1   Menorrhagia with regular cycle [N92 0]     Show:Clear all  [x]Manual[x]Template[]Copied    Added by:  [x]Azra Mckenzie    []Maximo for details  AMB US Pelvic Non OB  Date/Time: 12/11/2019 7:50 AM  Performed by: Gale Jones  Authorized by: Myke Valdez DO      Procedure details:     Indications: non-obstetric vaginal bleeding      Technique:  Transvaginal US, Non-OB    Position: lithotomy exam    Uterine findings:     Length (cm): 7 87    Height (cm):  3 83    Width (cm):  5 51    Endometrial stripe: identified      Endometrium thickness (mm):  10 6  Left ovary findings:     Left ovary:  Visualized    Length (cm): 3 29    Height (cm): 2  07    Width (cm): 1 65  Right ovary findings:     Right ovary:  Visualized    Length (cm): 3 42    Height (cm): 2 31    Width (cm): 2 33  Post-Procedure Details:     Impression:  Anteverted uterus and bilateral ovaries appear within normal limits  Right ovary contains a 6 6FC follicle  No free fluid  Tolerance:   Tolerated well, no immediate complications    Complications: no complications    Additional Procedure Comments:      [employer] P5 transvaginal transducer E8C with Serial Number [id number] was used during procedure and subsequently cleaned with high level disinfection utilizing the [employer]       Ultrasound performed at:   [place] [address] for [facility] [address]  [address]  [address] [facility], [address]   Phone: [phone]  Fax:  [phone]     Sonohysterogram  Date/Time: 12/11/2019 7:51 AM  Performed by: Yeimy Che DO  Authorized by: Yeimy Che DO      Consent:     Consent obtained:  Verbal and written    Consent given by:  Patient    Patient questions answered: yes      Patient agrees, verbalizes understanding, and wants to proceed: yes      Instructions and paperwork completed: yes    Pre-procedure:     Pre-procedure timeout performed: yes      Prepped with: Betadine    Procedure:     Cervix cleaned and prepped: yes      Catheter inserted: yes      Uterine cavity distended with saline: yes    Post-procedure:     Patient observed: yes      Post procedure instructions given to patient: yes      Patient tolerated procedure well with no complications: yes    Comments:      Sonohysterogram demonstrates irregularity along the posterior endometrium   Endometrial biopsy  Date/Time: 12/11/2019 7:52 AM  Performed by: Yeimy Che DO  Authorized by: Yeimy Che DO      Consent:     Consent obtained:  Verbal and written    Consent given by:  Patient    Patient questions answered: yes      Patient agrees, verbalizes understanding, and wants to proceed: yes      Instructions and paperwork completed: yes    Indication:     Indications: Other disorder of menstruation and other abnormal bleeding from female genital tract    Pre-procedure:     Pre-procedure timeout performed: yes    Procedure:     Procedure: endometrial biopsy with Pipelle      A bivalve speculum was placed in the vagina: yes      Cervix cleaned and prepped: yes      Specimen collected: specimen collected and sent to pathology      Patient tolerated procedure well with no complications: yes                 The following portions of the patient's history were reviewed and updated as appropriate:   She  has a past medical history of Carpal tunnel syndrome, GERD (gastroesophageal reflux disease), Measles, and Varicella  She   Patient Active Problem List    Diagnosis Date Noted    Pelvic cramping 2018    Visit for gynecologic examination 2018    Sore throat 2018     She  has a past surgical history that includes  section; Colposcopy; Tooth extraction; and Tubal ligation  Her family history is not on file  She  reports that she has never smoked  She has never used smokeless tobacco  She reports that she does not drink alcohol or use drugs    Current Outpatient Medications   Medication Sig Dispense Refill    albuterol (PROVENTIL HFA,VENTOLIN HFA) 90 mcg/act inhaler Inhale 2 puffs every 6 (six) hours as needed for wheezing (Patient not taking: Reported on 2019) 1 Inhaler 0    Ascorbic Acid (VITAMIN C) 100 MG tablet Take 100 mg by mouth daily      benzonatate (TESSALON PERLES) 100 mg capsule Take 1 capsule (100 mg total) by mouth 3 (three) times a day as needed for cough (Patient not taking: Reported on 2019) 20 capsule 0    calcium carbonate (OS-KATHE) 600 MG tablet Take 600 mg by mouth 2 (two) times a day with meals      Cholecalciferol (VITAMIN D) 50 MCG ( UT) CAPS Take by mouth      cyanocobalamin (VITAMIN B-12) 100 mcg tablet Take by mouth daily      magnesium 30 MG tablet Take 30 mg by mouth 2 (two) times a day      multivitamin (THERAGRAN) TABS Take 1 tablet by mouth daily      naproxen (NAPROSYN) 500 mg tablet Take 1 tablet by mouth as needed       No current facility-administered medications for this visit  Current Outpatient Medications on File Prior to Visit   Medication Sig    albuterol (PROVENTIL HFA,VENTOLIN HFA) 90 mcg/act inhaler Inhale 2 puffs every 6 (six) hours as needed for wheezing (Patient not taking: Reported on 11/22/2019)    Ascorbic Acid (VITAMIN C) 100 MG tablet Take 100 mg by mouth daily    benzonatate (TESSALON PERLES) 100 mg capsule Take 1 capsule (100 mg total) by mouth 3 (three) times a day as needed for cough (Patient not taking: Reported on 11/22/2019)    calcium carbonate (OS-KATHE) 600 MG tablet Take 600 mg by mouth 2 (two) times a day with meals    Cholecalciferol (VITAMIN D) 50 MCG (2000 UT) CAPS Take by mouth    cyanocobalamin (VITAMIN B-12) 100 mcg tablet Take by mouth daily    magnesium 30 MG tablet Take 30 mg by mouth 2 (two) times a day    multivitamin (THERAGRAN) TABS Take 1 tablet by mouth daily    naproxen (NAPROSYN) 500 mg tablet Take 1 tablet by mouth as needed     No current facility-administered medications on file prior to visit  She has No Known Allergies       Review of Systems   Constitutional: Negative  Respiratory: Negative  Cardiovascular: Negative  Gastrointestinal: Negative  Genitourinary: Positive for menstrual problem  Objective: There were no vitals taken for this visit  Physical Exam   Constitutional: She appears well-developed and well-nourished  Neck: Normal range of motion  Neck supple  No thyromegaly present  Cardiovascular: Normal rate, regular rhythm and normal heart sounds  Pulmonary/Chest: Effort normal and breath sounds normal  No respiratory distress  Abdominal: Soft   Bowel sounds are normal  She exhibits no distension and no mass  There is no tenderness  There is no rebound and no guarding  No hernia  Hernia confirmed negative in the right inguinal area and confirmed negative in the left inguinal area  Genitourinary: There is no rash, tenderness or lesion on the right labia  There is no rash, tenderness or lesion on the left labia  Uterus is not deviated, not enlarged, not fixed and not tender  Cervix exhibits no motion tenderness, no discharge and no friability  Right adnexum displays no mass, no tenderness and no fullness  Left adnexum displays no mass, no tenderness and no fullness  No erythema, tenderness or bleeding in the vagina  No vaginal discharge found  Lymphadenopathy:     She has no cervical adenopathy  No inguinal adenopathy noted on the right or left side

## 2019-12-11 NOTE — PROGRESS NOTES
AMB US Pelvic Non OB  Date/Time: 12/11/2019 7:50 AM  Performed by: Augustine Majano  Authorized by: Veronica Patterson DO     Procedure details:     Indications: non-obstetric vaginal bleeding      Technique:  Transvaginal US, Non-OB    Position: lithotomy exam    Uterine findings:     Length (cm): 7 87    Height (cm):  3 83    Width (cm):  5 51    Endometrial stripe: identified      Endometrium thickness (mm):  10 6  Left ovary findings:     Left ovary:  Visualized    Length (cm): 3 29    Height (cm): 2 07    Width (cm): 1 65  Right ovary findings:     Right ovary:  Visualized    Length (cm): 3 42    Height (cm): 2 31    Width (cm): 2 33  Post-Procedure Details:     Impression:  Anteverted uterus and bilateral ovaries appear within normal limits  Right ovary contains a 8 0AY follicle  No free fluid  Tolerance: Tolerated well, no immediate complications    Complications: no complications    Additional Procedure Comments:      [employer] P5 transvaginal transducer E8C with Serial Number [id number] was used during procedure and subsequently cleaned with high level disinfection utilizing the [employer]       Ultrasound performed at:     [facility] for [facility] [address]  [address]  [address], [address]  Phone: [phone]  Fax:  [phone]    Sonohysterogram  Date/Time: 12/11/2019 7:51 AM  Performed by: Veronica Patterson DO  Authorized by: Veronica Patterson DO     Consent:     Consent obtained:  Verbal and written    Consent given by:  Patient    Patient questions answered: yes      Patient agrees, verbalizes understanding, and wants to proceed: yes      Instructions and paperwork completed: yes    Pre-procedure:     Pre-procedure timeout performed: yes      Prepped with: Betadine    Procedure:     Cervix cleaned and prepped: yes      Catheter inserted: yes      Uterine cavity distended with saline: yes    Post-procedure:     Patient observed: yes      Post procedure instructions given to patient: yes      Patient tolerated procedure well with no complications: yes    Comments:      Sonohysterogram demonstrates irregularity along the posterior endometrium   Endometrial biopsy  Date/Time: 12/11/2019 7:52 AM  Performed by: Deysi Benz DO  Authorized by: Deysi Benz DO     Consent:     Consent obtained:  Verbal and written    Consent given by:  Patient    Patient questions answered: yes      Patient agrees, verbalizes understanding, and wants to proceed: yes      Instructions and paperwork completed: yes    Indication:     Indications:  Other disorder of menstruation and other abnormal bleeding from female genital tract    Pre-procedure:     Pre-procedure timeout performed: yes    Procedure:     Procedure: endometrial biopsy with Pipelle      A bivalve speculum was placed in the vagina: yes      Cervix cleaned and prepped: yes      Specimen collected: specimen collected and sent to pathology      Patient tolerated procedure well with no complications: yes

## 2021-01-28 ENCOUNTER — TRANSCRIBE ORDERS (OUTPATIENT)
Dept: ADMINISTRATIVE | Facility: HOSPITAL | Age: 41
End: 2021-01-28

## 2021-01-28 ENCOUNTER — LAB (OUTPATIENT)
Dept: LAB | Facility: MEDICAL CENTER | Age: 41
End: 2021-01-28
Payer: COMMERCIAL

## 2021-01-28 DIAGNOSIS — K59.00 CONSTIPATION, UNSPECIFIED CONSTIPATION TYPE: Primary | ICD-10-CM

## 2021-01-28 DIAGNOSIS — K59.00 CONSTIPATION, UNSPECIFIED CONSTIPATION TYPE: ICD-10-CM

## 2021-01-28 LAB
ALBUMIN SERPL BCP-MCNC: 4.1 G/DL (ref 3.5–5)
ALP SERPL-CCNC: 56 U/L (ref 46–116)
ALT SERPL W P-5'-P-CCNC: 42 U/L (ref 12–78)
ANION GAP SERPL CALCULATED.3IONS-SCNC: 5 MMOL/L (ref 4–13)
AST SERPL W P-5'-P-CCNC: 26 U/L (ref 5–45)
BASOPHILS # BLD AUTO: 0.05 THOUSANDS/ΜL (ref 0–0.1)
BASOPHILS NFR BLD AUTO: 1 % (ref 0–1)
BILIRUB SERPL-MCNC: 0.85 MG/DL (ref 0.2–1)
BUN SERPL-MCNC: 9 MG/DL (ref 5–25)
CALCIUM SERPL-MCNC: 9.3 MG/DL (ref 8.3–10.1)
CHLORIDE SERPL-SCNC: 104 MMOL/L (ref 100–108)
CO2 SERPL-SCNC: 29 MMOL/L (ref 21–32)
CREAT SERPL-MCNC: 0.78 MG/DL (ref 0.6–1.3)
EOSINOPHIL # BLD AUTO: 0.07 THOUSAND/ΜL (ref 0–0.61)
EOSINOPHIL NFR BLD AUTO: 1 % (ref 0–6)
ERYTHROCYTE [DISTWIDTH] IN BLOOD BY AUTOMATED COUNT: 13.8 % (ref 11.6–15.1)
GFR SERPL CREATININE-BSD FRML MDRD: 95 ML/MIN/1.73SQ M
GLUCOSE SERPL-MCNC: 82 MG/DL (ref 65–140)
HCT VFR BLD AUTO: 44.4 % (ref 34.8–46.1)
HGB BLD-MCNC: 14.8 G/DL (ref 11.5–15.4)
IMM GRANULOCYTES # BLD AUTO: 0.01 THOUSAND/UL (ref 0–0.2)
IMM GRANULOCYTES NFR BLD AUTO: 0 % (ref 0–2)
LYMPHOCYTES # BLD AUTO: 1.81 THOUSANDS/ΜL (ref 0.6–4.47)
LYMPHOCYTES NFR BLD AUTO: 37 % (ref 14–44)
MCH RBC QN AUTO: 28.7 PG (ref 26.8–34.3)
MCHC RBC AUTO-ENTMCNC: 33.3 G/DL (ref 31.4–37.4)
MCV RBC AUTO: 86 FL (ref 82–98)
MONOCYTES # BLD AUTO: 0.31 THOUSAND/ΜL (ref 0.17–1.22)
MONOCYTES NFR BLD AUTO: 6 % (ref 4–12)
NEUTROPHILS # BLD AUTO: 2.6 THOUSANDS/ΜL (ref 1.85–7.62)
NEUTS SEG NFR BLD AUTO: 55 % (ref 43–75)
NRBC BLD AUTO-RTO: 0 /100 WBCS
PLATELET # BLD AUTO: 238 THOUSANDS/UL (ref 149–390)
PMV BLD AUTO: 9.7 FL (ref 8.9–12.7)
POTASSIUM SERPL-SCNC: 3.7 MMOL/L (ref 3.5–5.3)
PROT SERPL-MCNC: 7.7 G/DL (ref 6.4–8.2)
RBC # BLD AUTO: 5.16 MILLION/UL (ref 3.81–5.12)
SODIUM SERPL-SCNC: 138 MMOL/L (ref 136–145)
TSH SERPL DL<=0.05 MIU/L-ACNC: 1.11 UIU/ML (ref 0.36–3.74)
WBC # BLD AUTO: 4.85 THOUSAND/UL (ref 4.31–10.16)

## 2021-01-28 PROCEDURE — 85025 COMPLETE CBC W/AUTO DIFF WBC: CPT

## 2021-01-28 PROCEDURE — 84443 ASSAY THYROID STIM HORMONE: CPT

## 2021-01-28 PROCEDURE — 80053 COMPREHEN METABOLIC PANEL: CPT

## 2021-01-28 PROCEDURE — 36415 COLL VENOUS BLD VENIPUNCTURE: CPT

## 2021-06-17 ENCOUNTER — OFFICE VISIT (OUTPATIENT)
Dept: OBGYN CLINIC | Facility: CLINIC | Age: 41
End: 2021-06-17
Payer: COMMERCIAL

## 2021-06-17 VITALS — SYSTOLIC BLOOD PRESSURE: 115 MMHG | DIASTOLIC BLOOD PRESSURE: 60 MMHG

## 2021-06-17 DIAGNOSIS — Z12.31 ENCOUNTER FOR SCREENING MAMMOGRAM FOR MALIGNANT NEOPLASM OF BREAST: ICD-10-CM

## 2021-06-17 DIAGNOSIS — Z01.419 ENCOUNTER FOR ANNUAL ROUTINE GYNECOLOGICAL EXAMINATION: Primary | ICD-10-CM

## 2021-06-17 PROCEDURE — S0612 ANNUAL GYNECOLOGICAL EXAMINA: HCPCS | Performed by: OBSTETRICS & GYNECOLOGY

## 2021-06-17 NOTE — PROGRESS NOTES
ASSESSMENT & PLAN: Ankit Hammond is a 39 y o  [place] with normal gynecologic exam     1   Routine well woman exam done today  2  Pap and HPV:  The patient's last pap and hpv was   It was normal     Pap and cotesting was not done today  Current ASCCP Guidelines reviewed  3   Mammogram ordered  4  The following were reviewed in today's visit: breast self exam, mammography screening ordered, family planning choices, exercise and healthy diet      CC:  Annual Gynecologic Examination    HPI: Ankit Hammond is a 39 y o  [place] who presents for annual gynecologic examination  She has the following concerns:  None     Health Maintenance:    She wears her seatbelt routinely  She does perform regular monthly self breast exams  She feels safe at home  Patient Active Problem List   Diagnosis    Sore throat    Pelvic cramping    Visit for gynecologic examination       Past Medical History:   Diagnosis Date    Carpal tunnel syndrome     GERD (gastroesophageal reflux disease)     Measles     Varicella        Past Surgical History:   Procedure Laterality Date     SECTION      COLPOSCOPY      TOOTH EXTRACTION      TUBAL LIGATION         Past OB/Gyn History:  OB History        1    Para   1    Term   1            AB        Living   1       SAB        TAB        Ectopic        Multiple        Live Births                      Pt does not have menstrual issues     History of sexually transmitted infection: No   History of abnormal pap smears: No      Patient is currently sexually active  heterosexual    History reviewed  No pertinent family history      Social History:  Social History     Socioeconomic History    Marital status: /Civil Union     Spouse name: Not on file    Number of children: Not on file    Years of education: Not on file    Highest education level: Not on file   Occupational History    Not on file   Tobacco Use    Smoking status: Never Smoker  Smokeless tobacco: Never Used   Substance and Sexual Activity    Alcohol use: No    Drug use: No    Sexual activity: Yes     Partners: Male     Birth control/protection: Female Sterilization   Other Topics Concern    Not on file   Social History Narrative    Not on file     Social Determinants of Health     Financial Resource Strain:     Difficulty of Paying Living Expenses:    Food Insecurity:     Worried About Running Out of Food in the Last Year:     [address] in the Last Year:    Transportation Needs:     Lack of Transportation (Medical):      Lack of Transportation (Non-Medical):    Physical Activity:     Days of Exercise per Week:     Minutes of Exercise per Session:    Stress:     Feeling of Stress :    Social Connections:     Frequency of Communication with Friends and Family:     Frequency of Social Gatherings with Friends and Family:     Attends Gnosticist Services:     Active Member of Clubs or Organizations:     Attends Club or Organization Meetings:     Marital Status:    Intimate Partner Violence:     Fear of Current or Ex-Partner:     Emotionally Abused:     Physically Abused:     Sexually Abused:        No Known Allergies    Current Outpatient Medications:     Ascorbic Acid (VITAMIN C) 100 MG tablet, Take 100 mg by mouth daily, Disp: , Rfl:     calcium carbonate (OS-KATHE) 600 MG tablet, Take 600 mg by mouth 2 (two) times a day with meals, Disp: , Rfl:     Cholecalciferol (VITAMIN D) 50 MCG (2000 UT) CAPS, Take by mouth, Disp: , Rfl:     cyanocobalamin (VITAMIN B-12) 100 mcg tablet, Take by mouth daily, Disp: , Rfl:     magnesium 30 MG tablet, Take 30 mg by mouth 2 (two) times a day, Disp: , Rfl:     multivitamin (THERAGRAN) TABS, Take 1 tablet by mouth daily, Disp: , Rfl:     naproxen (NAPROSYN) 500 mg tablet, Take 1 tablet by mouth as needed, Disp: , Rfl:     albuterol (PROVENTIL HFA,VENTOLIN HFA) 90 mcg/act inhaler, Inhale 2 puffs every 6 (six) hours as needed for wheezing (Patient not taking: Reported on 11/22/2019), Disp: 1 Inhaler, Rfl: 0    benzonatate (TESSALON PERLES) 100 mg capsule, Take 1 capsule (100 mg total) by mouth 3 (three) times a day as needed for cough (Patient not taking: Reported on 11/22/2019), Disp: 20 capsule, Rfl: 0    Review of Systems:    Review of Systems  Constitutional :no fever, feels well, no tiredness, no recent weight gain or loss  ENT: no ear ache, no loss of hearing, no nosebleeds or nasal discharge, no sore throat or hoarseness  Cardiovascular: no complaints of slow or fast heart beat, no chest pain, no palpitations, no leg claudication or lower extremity edema  Respiratory: no complaints of shortness of shortness of breath, no MENDOZA  Breasts:no complaints of breast pain, breast lump, or nipple discharge  Gastrointestinal: no complaints of abdominal pain, constipation, nausea, vomiting, or diarrhea or bloody stools  Genitourinary : no complaints of dysuria, incontinence, pelvic pain, no dysmenorrhea, vaginal discharge or abnormal vaginal bleeding and as noted in HPI  Musculoskeletal: no complaints of arthralgia, no myalgia, no joint swelling or stiffness, no limb pain or swelling  Integumentary: no complaints of skin rash or lesion, itching or dry skin  Neurological: no complaints of headache, no confusion, no numbness or tingling, no dizziness or fainting    Objective      /60   LMP 06/07/2021     General:   appears stated age, cooperative, alert normal mood and affect   Lungs: Unlabored breathing    Breasts: normal appearance, no masses or tenderness   Abdomen: soft, non-tender, without masses or organomegaly   Vulva: normal   Vagina: normal vagina, no discharge, exudate, lesion, or erythema   Urethra: normal   Cervix: Normal, no discharge  Nontender  Uterus: normal size, contour, position, consistency, mobility, non-tender   Adnexa: no mass, fullness, tenderness   Skin normal skin turgor and no rashes     Psychiatric orientation to person, place, and time: normal  mood and affect: normal

## 2021-11-02 ENCOUNTER — AMB VIDEO VISIT (OUTPATIENT)
Dept: OTHER | Facility: HOSPITAL | Age: 41
End: 2021-11-02

## 2021-11-02 PROCEDURE — ECARE PR SL URGENT CARE VIRTUAL VISIT: Performed by: FAMILY MEDICINE

## 2021-12-23 ENCOUNTER — OFFICE VISIT (OUTPATIENT)
Dept: OBGYN CLINIC | Facility: MEDICAL CENTER | Age: 41
End: 2021-12-23
Payer: COMMERCIAL

## 2021-12-23 VITALS
WEIGHT: 138.5 LBS | SYSTOLIC BLOOD PRESSURE: 114 MMHG | HEIGHT: 68 IN | DIASTOLIC BLOOD PRESSURE: 70 MMHG | BODY MASS INDEX: 20.99 KG/M2

## 2021-12-23 DIAGNOSIS — N76.1 CHRONIC VAGINITIS: ICD-10-CM

## 2021-12-23 DIAGNOSIS — N76.0 BV (BACTERIAL VAGINOSIS): ICD-10-CM

## 2021-12-23 DIAGNOSIS — B96.89 BV (BACTERIAL VAGINOSIS): ICD-10-CM

## 2021-12-23 DIAGNOSIS — N89.8 VAGINAL DISCHARGE: ICD-10-CM

## 2021-12-23 LAB
BV WHIFF TEST VAG QL: POSITIVE
CLUE CELLS SPEC QL WET PREP: ABNORMAL
PH SMN: 5 [PH]
SL AMB POCT WET MOUNT: ABNORMAL
T VAGINALIS VAG QL WET PREP: NEGATIVE
YEAST VAG QL WET PREP: ABNORMAL

## 2021-12-23 PROCEDURE — 87210 SMEAR WET MOUNT SALINE/INK: CPT | Performed by: ADVANCED PRACTICE MIDWIFE

## 2021-12-23 PROCEDURE — 87660 TRICHOMONAS VAGIN DIR PROBE: CPT | Performed by: ADVANCED PRACTICE MIDWIFE

## 2021-12-23 PROCEDURE — 87510 GARDNER VAG DNA DIR PROBE: CPT | Performed by: ADVANCED PRACTICE MIDWIFE

## 2021-12-23 PROCEDURE — 87480 CANDIDA DNA DIR PROBE: CPT | Performed by: ADVANCED PRACTICE MIDWIFE

## 2021-12-23 PROCEDURE — 99213 OFFICE O/P EST LOW 20 MIN: CPT | Performed by: ADVANCED PRACTICE MIDWIFE

## 2021-12-23 RX ORDER — METRONIDAZOLE 500 MG/1
500 TABLET ORAL EVERY 12 HOURS SCHEDULED
Qty: 14 TABLET | Refills: 0 | Status: SHIPPED | OUTPATIENT
Start: 2021-12-23 | End: 2021-12-30

## 2021-12-23 RX ORDER — IBUPROFEN 200 MG
200 TABLET ORAL EVERY 6 HOURS PRN
COMMUNITY

## 2021-12-23 RX ORDER — NYSTATIN AND TRIAMCINOLONE ACETONIDE 100000; 1 [USP'U]/G; MG/G
OINTMENT TOPICAL 2 TIMES DAILY
Qty: 30 G | Refills: 0 | Status: SHIPPED | OUTPATIENT
Start: 2021-12-23

## 2021-12-23 RX ORDER — FLUCONAZOLE 150 MG/1
150 TABLET ORAL ONCE
Qty: 1 TABLET | Refills: 0 | Status: SHIPPED | OUTPATIENT
Start: 2021-12-23 | End: 2021-12-23

## 2021-12-26 LAB
CANDIDA RRNA VAG QL PROBE: NEGATIVE
G VAGINALIS RRNA GENITAL QL PROBE: NEGATIVE
T VAGINALIS RRNA GENITAL QL PROBE: NEGATIVE

## 2022-01-11 NOTE — PROGRESS NOTES
[facility] [address] [place] [address]    Assessment/Plan:  No problem-specific Assessment & Plan notes found for this encounter  Diagnoses and all orders for this visit:    Vaginal discharge  -     VAGINOSIS DNA PROBE (AFFIRM)  -     Genital Comprehensive Culture    Vaginal odor  -     VAGINOSIS DNA PROBE (AFFIRM)  -     Genital Comprehensive Culture    Vaginal itching  -     VAGINOSIS DNA PROBE (AFFIRM)  -     Genital Comprehensive Culture      - to restart Mycolog ll cream morning and night  - stop using body scrub and warm not hot water to wash  - will repeat cultures and call with results  - follow-up prn    Subjective:   Jerson Johnson is a 39 y o  Erickson Simone female  CC: burning and itching    HPI: Reports itching and burning improved but did not resolve  12/24 last period  Had sex once since last time she was here  States that she has only used Mycolog cream and oral medication  No improvement after menses  States that it felt better the first 2 days of medication  Affirm in Dec was negative  Original symptoms started in October and treated for potential yeast, improved but returned with menses  Used monistat  Treated in Dec for BV  Notes that burning and itching present again  Reviewed hx of changes in soaps and shampoos  States that she is using a body scrub  ROS: Review of Systems   Constitutional: Negative for activity change, appetite change, fatigue and fever  Cardiovascular: Negative for leg swelling  Gastrointestinal: Negative for constipation and diarrhea  Genitourinary: Positive for vaginal discharge  Negative for difficulty urinating, dysuria, frequency, menstrual problem, pelvic pain, urgency, vaginal bleeding and vaginal pain  Itching labial area  Psychiatric/Behavioral: The patient is not nervous/anxious          PFSH: The following portions of the patient's history were reviewed and updated as appropriate: allergies, current medications, past family history, past medical history, obstetric history, gynecologic history, past social history, past surgical history and problem list        Objective:  /62 (BP Location: Right arm, Patient Position: Sitting, Cuff Size: Standard)   Ht 5' 8" (1 727 m)   Wt 64 3 kg (141 lb 11 2 oz)   LMP 12/24/2021 (Exact Date)   BMI 21 55 kg/m²    Physical Exam  Vitals reviewed  Constitutional:       Appearance: Normal appearance  Pulmonary:      Effort: Pulmonary effort is normal    Genitourinary:     Labia:         Right: Tenderness present  No rash or lesion  Left: Tenderness present  No rash or lesion  Vagina: Vaginal discharge present  No erythema, tenderness or bleeding  Cervix: No discharge, friability, lesion, erythema or cervical bleeding  Comments: Inner aspect of labia minora appear irritated  No lesions, discharge, or bleeding  Wet mount negative  Neurological:      Mental Status: She is alert     Psychiatric:         Mood and Affect: Mood normal          Behavior: Behavior normal          Judgment: Judgment normal

## 2022-01-13 ENCOUNTER — OFFICE VISIT (OUTPATIENT)
Dept: OBGYN CLINIC | Facility: MEDICAL CENTER | Age: 42
End: 2022-01-13
Payer: COMMERCIAL

## 2022-01-13 VITALS
SYSTOLIC BLOOD PRESSURE: 112 MMHG | DIASTOLIC BLOOD PRESSURE: 62 MMHG | BODY MASS INDEX: 21.47 KG/M2 | HEIGHT: 68 IN | WEIGHT: 141.7 LBS

## 2022-01-13 DIAGNOSIS — N89.8 VAGINAL ODOR: ICD-10-CM

## 2022-01-13 DIAGNOSIS — N89.8 VAGINAL ITCHING: ICD-10-CM

## 2022-01-13 DIAGNOSIS — N89.8 VAGINAL DISCHARGE: Primary | ICD-10-CM

## 2022-01-13 PROCEDURE — 87660 TRICHOMONAS VAGIN DIR PROBE: CPT | Performed by: ADVANCED PRACTICE MIDWIFE

## 2022-01-13 PROCEDURE — 87070 CULTURE OTHR SPECIMN AEROBIC: CPT | Performed by: ADVANCED PRACTICE MIDWIFE

## 2022-01-13 PROCEDURE — 87510 GARDNER VAG DNA DIR PROBE: CPT | Performed by: ADVANCED PRACTICE MIDWIFE

## 2022-01-13 PROCEDURE — 99212 OFFICE O/P EST SF 10 MIN: CPT | Performed by: ADVANCED PRACTICE MIDWIFE

## 2022-01-13 PROCEDURE — 87480 CANDIDA DNA DIR PROBE: CPT | Performed by: ADVANCED PRACTICE MIDWIFE

## 2022-01-17 ENCOUNTER — TELEPHONE (OUTPATIENT)
Dept: OBGYN CLINIC | Facility: CLINIC | Age: 42
End: 2022-01-17

## 2022-01-17 LAB — BACTERIA GENITAL AEROBE CULT: NORMAL

## 2022-03-15 NOTE — TELEPHONE ENCOUNTER
Unable to leave message  Cultures were negative  Would recommend continuing with the Mycolog ll cream morning and night for another 7 -14 days  To stop using any soaps that have body scrub in them  May apply Vitamin A&D ointment to the area of irritation or desitin to protect skin and decrease irritation  DISCHARGE

## 2022-05-25 ENCOUNTER — OFFICE VISIT (OUTPATIENT)
Dept: OBGYN CLINIC | Facility: CLINIC | Age: 42
End: 2022-05-25
Payer: COMMERCIAL

## 2022-05-25 VITALS
HEIGHT: 68 IN | BODY MASS INDEX: 21.46 KG/M2 | DIASTOLIC BLOOD PRESSURE: 60 MMHG | WEIGHT: 141.6 LBS | SYSTOLIC BLOOD PRESSURE: 90 MMHG

## 2022-05-25 DIAGNOSIS — B96.89 BACTERIAL VAGINOSIS: ICD-10-CM

## 2022-05-25 DIAGNOSIS — N89.8 VAGINAL DISCHARGE: Primary | ICD-10-CM

## 2022-05-25 DIAGNOSIS — N90.89 VULVAR IRRITATION: ICD-10-CM

## 2022-05-25 DIAGNOSIS — R10.2 PELVIC PAIN IN FEMALE: ICD-10-CM

## 2022-05-25 DIAGNOSIS — N76.0 BACTERIAL VAGINOSIS: ICD-10-CM

## 2022-05-25 PROCEDURE — 81514 NFCT DS BV&VAGINITIS DNA ALG: CPT | Performed by: OBSTETRICS & GYNECOLOGY

## 2022-05-25 PROCEDURE — 99214 OFFICE O/P EST MOD 30 MIN: CPT | Performed by: OBSTETRICS & GYNECOLOGY

## 2022-05-25 RX ORDER — TRIAMCINOLONE ACETONIDE 1 MG/G
CREAM TOPICAL 2 TIMES DAILY
Qty: 30 G | Refills: 0 | Status: SHIPPED | OUTPATIENT
Start: 2022-05-25

## 2022-05-25 RX ORDER — METRONIDAZOLE 7.5 MG/G
1 GEL VAGINAL
Qty: 5 G | Refills: 0 | Status: SHIPPED | OUTPATIENT
Start: 2022-05-25 | End: 2022-05-30

## 2022-05-25 NOTE — PROGRESS NOTES
Assessment:   Lee was seen today for vaginitis  Diagnoses and all orders for this visit:    Vaginal discharge  -     Molecular Vaginal Panel    Pelvic pain in female  -     US pelvis complete non OB; Future    Bacterial vaginosis  -     metroNIDAZOLE (METROGEL) 0 75 % vaginal gel; Insert 1 application into the vagina daily at bedtime for 5 days    Vulvar irritation  -     triamcinolone (KENALOG) 0 1 % cream; Apply topically 2 (two) times a day        Plan:  1  Wet prep was obtained  Molecular vaginal panel was obtained  Patient was treated Metrogel and triamcinolone cream for the external irritation  2  Discussion with patient regarding continued pelvic pain and menorrhagia  Patient given referral for repeat pelvic ultrasound, last was done in 2019  Also reviewed contraceptive options they may address her symptoms  Patient very concerned regarding SE's of medication  Patient strongly encouraged to consider an IUD like the Mirena  Discussed procedure for insertion  Patient given information pamphlet and preauthorization information  She will call us if she would like to proceed  All questions answered  CHIEF COMPLAINT: VD and external irritation    Subjective:   Andrzej Beltran is a 43 y o  yo female who presents for VD and external irritation  Pt reports continued vaginal discharge and now with an odor  Has only had one life time partner  Sx's started around the end of October and had itching after her menses  Pt got OTC monistat and it seemed like it went away  Then it returned after her menses and pt tried OTC again  Pt then went for an appointment and got diflucan and flagyl  Pt reports symptoms are itching and a yellowish discharge  Originally d/c was cottage cheese like  Pt reports she returned in January and was given a cream which hasn't helped  Pt reports h/o endometriosis  Pt reports continued painful menses  Pt will get severe back pain for the first two days    Pain will go away but johan  Feels like she is in child birth  Menses are very heavy and can last for 7-8 days  ROS:   She denies hematuria, dysuria, constipation, diarrhea, fevers, chills, nausea or emesis      Patient Active Problem List   Diagnosis    Sore throat    Pelvic cramping    Visit for gynecologic examination       Past Medical History:   Diagnosis Date    Carpal tunnel syndrome     GERD (gastroesophageal reflux disease)     Measles     Varicella        Social History     Socioeconomic History    Marital status: /Civil Union     Spouse name: Not on file    Number of children: Not on file    Years of education: Not on file    Highest education level: Not on file   Occupational History    Not on file   Tobacco Use    Smoking status: Never Smoker    Smokeless tobacco: Never Used   Vaping Use    Vaping Use: Never used   Substance and Sexual Activity    Alcohol use: No    Drug use: No    Sexual activity: Yes     Partners: Male     Birth control/protection: Female Sterilization   Other Topics Concern    Not on file   Social History Narrative    Not on file     Social Determinants of Health     Financial Resource Strain: Not on file   Food Insecurity: Not on file   Transportation Needs: Not on file   Physical Activity: Not on file   Stress: Not on file   Social Connections: Not on file   Intimate Partner Violence: Not on file   Housing Stability: Not on file         Current Outpatient Medications:     metroNIDAZOLE (METROGEL) 0 75 % vaginal gel, Insert 1 application into the vagina daily at bedtime for 5 days, Disp: 5 g, Rfl: 0    triamcinolone (KENALOG) 0 1 % cream, Apply topically 2 (two) times a day, Disp: 30 g, Rfl: 0    albuterol (PROVENTIL HFA,VENTOLIN HFA) 90 mcg/act inhaler, Inhale 2 puffs every 6 (six) hours as needed for wheezing (Patient not taking: Reported on 11/22/2019), Disp: 1 Inhaler, Rfl: 0    Ascorbic Acid (VITAMIN C) 100 MG tablet, Take 100 mg by mouth daily, Disp: , Rfl:     benzonatate (TESSALON PERLES) 100 mg capsule, Take 1 capsule (100 mg total) by mouth 3 (three) times a day as needed for cough (Patient not taking: Reported on 11/22/2019), Disp: 20 capsule, Rfl: 0    calcium carbonate (OS-KATHE) 600 MG tablet, Take 600 mg by mouth 2 (two) times a day with meals (Patient not taking: Reported on 12/23/2021 ), Disp: , Rfl:     Cholecalciferol (VITAMIN D) 50 MCG (2000 UT) CAPS, Take by mouth, Disp: , Rfl:     cyanocobalamin (VITAMIN B-12) 100 mcg tablet, Take by mouth daily, Disp: , Rfl:     ibuprofen (MOTRIN) 200 mg tablet, Take 200 mg by mouth every 6 (six) hours as needed, Disp: , Rfl:     magnesium 30 MG tablet, Take 30 mg by mouth 2 (two) times a day, Disp: , Rfl:     multivitamin (THERAGRAN) TABS, Take 1 tablet by mouth daily, Disp: , Rfl:     naproxen (NAPROSYN) 500 mg tablet, Take 1 tablet by mouth as needed, Disp: , Rfl:     nystatin-triamcinolone (MYCOLOG-II) ointment, Apply topically 2 (two) times a day, Disp: 30 g, Rfl: 0    No Known Allergies    BP 90/60   Ht 5' 8" (1 727 m)   Wt 64 2 kg (141 lb 9 6 oz)   LMP 05/16/2022   Breastfeeding No   BMI 21 53 kg/m²     GEN: The patient was alert and oriented x3, pleasant well-appearing female in no acute distress  Pelvic: Normal appearing external female genitalia with patches of skin hypopigmentation, normal vaginal epithelium, normalappearing cervix  positive for a small amt of whitish discharge noted  Wet Prep: Clue cells positive, Hyphae negative, Trichomonas negative  Whiff Test was not performed

## 2022-05-25 NOTE — PATIENT INSTRUCTIONS
Intrauterine Device   WHAT YOU NEED TO KNOW:   What is an intrauterine device (IUD)? An IUD is a type of birth control that is inserted into your uterus  It is a small, flexible piece of plastic with a string on the end  It is inserted and removed by your healthcare provider  IUDs prevent sperm from reaching or fertilizing an egg  IUDs also prevent a fertilized egg from attaching to the uterus and developing into a fetus  What are the most common types of IUDs? Your healthcare provider will recommend the type of IUD that is right for you  This is based on your age and if you have had a child  If you have not had a child, a smaller IUD will be used  A copper IUD  slowly releases a small amount of copper into your uterus  This IUD can remain in place for up to 10 years  A hormone-releasing IUD  slowly releases a small amount of progesterone into your uterus  Progesterone is a hormone that is made by your body to help control your periods  This IUD can remain in place for 3 to 5 years  What are the advantages of an IUD? An IUD is 98% to 99% effective in preventing pregnancy  The IUD can be removed by your healthcare provider if you decide to have a baby  You may be able to get pregnant as soon as the IUD is removed  An IUD protects you from pregnancy right after it is inserted  You do not have to stop sexual activity to insert it  You do not have to remember to take your birth control pill  Copper IUDs are safer for some women than oral birth control pills  Examples include women who smoke or have a history of blood clots  Hormone-releasing IUDs may decrease certain health problems  Examples include bleeding and cramping that happen with your monthly period  What are the disadvantages of an IUD? There is a small chance that you could get pregnant  Sometimes the IUD cannot be removed after you get pregnant  This increases your risk of a miscarriage or an ectopic pregnancy   Ectopic pregnancy is when the fertilized egg starts to grow somewhere other than your uterus  An IUD does not protect you from sexually transmitted infections  You may have cramps during the first weeks after you get the IUD  A copper IUD may cause your monthly period to be heavier or more painful  This is more common within the first 3 months after you get the IUD  You may need to have your IUD removed if your bleeding or pain becomes severe  You may have spotting between periods  There is a small risk of an infection within the first 20 days after the IUD is placed  Infection can lead to pelvic inflammatory disease  This can cause infertility  Your uterus may tear when the IUD is inserted  The IUD may slip part or all of the way out of your uterus  How is the IUD inserted? The IUD is usually inserted during your monthly period  This may help decrease the amount of discomfort you have during the procedure  It also helps make sure that you are not pregnant  You will be asked to lie down and place your feet in stirrups  Your healthcare provider will gently insert a speculum into your vagina  This is the same tool used during a Pap smear  The speculum allows your healthcare provider to see inside your vagina to your cervix  The cervix is the opening of your uterus  Your healthcare provider will clean your cervix with an antiseptic solution to prevent infection  You may be given numbing medicine  A long plastic tube is gently passed through your cervix and into your uterus  This tube has the IUD inside of it  The IUD is pushed out of the tube and into your uterus  You may have cramps as the IUD is inserted  The tube is removed after the IUD is in place  How can I make sure my IUD is still in place? An IUD has a string made of plastic thread  One to 2 inches of this string hangs into your vagina  You cannot see this string, and it should not cause problems when you have sex   Check your IUD string every 3 days for the first 3 months after it is inserted  After that, check the string after each monthly period  Do the following to check the placement of your IUD:  Wash your hands with soap and warm water  Dry them with a clean towel  Bend your knees and squat low to the ground  Gently put your index finger inside your vagina  The cervix is at the top of the vagina and feels like the tip of your nose  Feel for the IUD string  Do not pull on the string  You should not be able to feel the firm plastic of the IUD itself  Wash your hands after you check your IUD string  Where can I find more information? [facility] [address] , [place]  Phone: [phone]  Web Address: https://[employer]    When should I seek immediate care? You have severe pain or bleeding during your period  You have a fever and severe abdominal pain  When should I call my doctor? You think you are pregnant  The IUD has come out  You have bleeding from your vagina after you have sex, and it is not your period  You have pain during sex  You cannot feel the IUD string, the string feels longer, or you feel the plastic of the IUD itself  You have vaginal discharge that is green, yellow, or has a foul odor  You have questions or concerns about your condition or care  CARE AGREEMENT:   You have the right to help plan your care  Learn about your health condition and how it may be treated  Discuss treatment options with your healthcare providers to decide what care you want to receive  You always have the right to refuse treatment  The above information is an  only  It is not intended as medical advice for individual conditions or treatments  Talk to your doctor, nurse or pharmacist before following any medical regimen to see if it is safe and effective for you    © Copyright [employer] 2022 Information is for End User's use only and may not be sold, redistributed or otherwise used for commercial purposes  All illustrations and images included in CareNotes® are the copyrighted property of A D A M , Inc  or [facility] [employer]   Thank you for visiting [facility]  You may be invited to complete a survey about you visit  Your responses will help us improve care we provide  A 10 means the care you received at your visit met your expectations  If you are unable to give a 10 please list reasons so we can work on improving your patient experience

## 2022-05-26 LAB
C GLABRATA DNA VAG QL NAA+PROBE: NEGATIVE
C KRUSEI DNA VAG QL NAA+PROBE: NEGATIVE
CANDIDA SP 6 PNL VAG NAA+PROBE: NEGATIVE
T VAGINALIS DNA VAG QL NAA+PROBE: NEGATIVE
VAGINOSIS/ITIS DNA PNL VAG PROBE+SIG AMP: POSITIVE

## 2022-06-10 ENCOUNTER — HOSPITAL ENCOUNTER (OUTPATIENT)
Dept: ULTRASOUND IMAGING | Facility: MEDICAL CENTER | Age: 42
Discharge: HOME | End: 2022-06-10
Payer: COMMERCIAL

## 2022-06-10 DIAGNOSIS — R10.2 PELVIC PAIN IN FEMALE: ICD-10-CM

## 2022-06-10 PROCEDURE — 76830 TRANSVAGINAL US NON-OB: CPT

## 2022-06-10 PROCEDURE — 76856 US EXAM PELVIC COMPLETE: CPT

## 2022-12-12 ENCOUNTER — OFFICE VISIT (OUTPATIENT)
Dept: OBGYN CLINIC | Facility: CLINIC | Age: 42
End: 2022-12-12

## 2022-12-12 VITALS
HEART RATE: 74 BPM | TEMPERATURE: 97.5 F | BODY MASS INDEX: 20.31 KG/M2 | DIASTOLIC BLOOD PRESSURE: 64 MMHG | HEIGHT: 68 IN | OXYGEN SATURATION: 99 % | WEIGHT: 134 LBS | SYSTOLIC BLOOD PRESSURE: 100 MMHG

## 2022-12-12 DIAGNOSIS — B96.89 BACTERIAL VAGINOSIS: ICD-10-CM

## 2022-12-12 DIAGNOSIS — Z12.31 ENCOUNTER FOR SCREENING MAMMOGRAM FOR MALIGNANT NEOPLASM OF BREAST: ICD-10-CM

## 2022-12-12 DIAGNOSIS — Z01.419 ENCOUNTER FOR ANNUAL ROUTINE GYNECOLOGICAL EXAMINATION: Primary | ICD-10-CM

## 2022-12-12 DIAGNOSIS — N76.0 BACTERIAL VAGINOSIS: ICD-10-CM

## 2022-12-12 DIAGNOSIS — Z12.4 SCREENING FOR CERVICAL CANCER: ICD-10-CM

## 2022-12-12 DIAGNOSIS — N89.8 VAGINAL DISCHARGE: ICD-10-CM

## 2022-12-12 LAB
BV WHIFF TEST VAG QL: ABNORMAL
C TRACH DNA SPEC QL NAA+PROBE: NEGATIVE
CLUE CELLS SPEC QL WET PREP: PRESENT
N GONORRHOEA DNA SPEC QL NAA+PROBE: NEGATIVE
PH SMN: ABNORMAL [PH]
SL AMB POCT WET MOUNT: ABNORMAL
T VAGINALIS VAG QL WET PREP: ABNORMAL
YEAST VAG QL WET PREP: ABNORMAL

## 2022-12-12 RX ORDER — METRONIDAZOLE 7.5 MG/G
1 GEL VAGINAL
Qty: 70 G | Refills: 3 | Status: SHIPPED | OUTPATIENT
Start: 2022-12-12

## 2022-12-12 NOTE — ASSESSMENT & PLAN NOTE
Exam findings today consistent with bacterial vaginosis infection  Recommend treatment with metrogel  Discussed suppression given h/o multiple recurrences  Vulvar hygiene measures and medication instructions reviewed

## 2022-12-12 NOTE — ASSESSMENT & PLAN NOTE
- Discussed ACOG guidelines for pap smear screening frequency: performed today  - Discussed healthy lifestyle recommendations for diet, exercise and self breast awareness   - Discussed ACOG recommendations for screening mammograms: no prior, reviewed rationale for screening, pt is reticent due to having no family history and questioning necessity  Discussed limitations of self or clinical breast exam and that screening is recommended to start at age 36, pt to consider  Order for screening mammogram placed and encouraged f/u if she desires to discuss further   - Discussed age based recommendations for adequate calcium and vitamin D intake  No additional osteoporosis screening indicated at this time  - Discussed ACOG recommendations for colon cancer screening: not indicated at this time  - Safe sex practices were discussed and STI testing was desired, GC/CT collected  - Contraceptive options were reviewed: s/p BTL   - Routine follow up in 1 year was recommended or sooner as needed  All questions and concerns were addressed

## 2022-12-12 NOTE — PATIENT INSTRUCTIONS
Recommended the following vulvar hygiene measures:  Avoid scented products (pads, tampons, soaps, detergents, perfumes, sprays, bubble baths, wipes)  Avoid unnecessary prolonged usage of pads  Wear cotton underwear and avoid tight fitting pants/tights/leotards  Change out of damp exercise clothes and swimsuits as soon as possible  Sleep without underwear  No douching  If you use the Always brand pad, try switching to another brand or tampons instead - go with all cotton/natural products such as [employer] and [employer]  Do not use a washcloth or soap directly on the vagina

## 2022-12-12 NOTE — PROGRESS NOTES
Ramy Mendez is a 43 y o  female who presents for annual exam       Chief Complaint   Patient presents with   • Establish Care     New pt problem visit   • Vaginal Discharge     And odor and itching, pt states she deals w/BV frequently     New GYN - previously at [facility] office  H/o pelvic pain, painful periods, heavy periods  Had previously considered hysterectomy  Most recent pelvic US in 2022 was normal   Recently started taking collagen peptide - states menstrual/pelvic pain is much better  Back pain starts week before menses   Regular menses, heavy still for first 2 days     Intermittent vaginal irritation and discharge for several months - self tx with OTC yeast medication, when seen May 2022 +BV, metrogel did help but felt like sx recurred soon afterwards   Always itching - externally  States she "washes frequently" as a result   Odor and discharge will recur after this past menses  Switched to using tampons recently as she thought maybe pads were contributing   /monogamous but desires STI testing       Last Pap: 2019 NILM/HPV neg   Last mammogram: Not on file no prior       HPV vaccine completed:no  Current contraception: tubal ligation  History of abnormal Pap smear: yes - remote h/o abnml, denies excisional procedures   History of abnormal mammogram: no - no prior       Family history of uterine or ovarian cancer: no  Family history of breast cancer: no  Family history of colon cancer: no      Menstrual History:  OB History        2    Para   1    Term   1            AB   1    Living   1       SAB   1    IAB        Ectopic        Multiple        Live Births   1                    Patient's last menstrual period was 2022 (exact date)    Menstrual Flow: Moderate  Dysmenorrhea: (!) Mild      Past Medical History:   Diagnosis Date   • Carpal tunnel syndrome    • GERD (gastroesophageal reflux disease)    • Measles    • Varicella    • Vitiligo      Past Surgical History:   Procedure Laterality Date   •  SECTION     • COLPOSCOPY     • TOOTH EXTRACTION     • TUBAL LIGATION       History reviewed  No pertinent family history  Social History     Tobacco Use   • Smoking status: Never   • Smokeless tobacco: Never   Vaping Use   • Vaping Use: Never used   Substance Use Topics   • Alcohol use: No   • Drug use: No          Current Outpatient Medications:   •  Ascorbic Acid (VITAMIN C) 100 MG tablet, Take 100 mg by mouth daily, Disp: , Rfl:   •  Cholecalciferol (VITAMIN D) 50 MCG (2000 UT) CAPS, Take by mouth, Disp: , Rfl:   •  cyanocobalamin (VITAMIN B-12) 100 mcg tablet, Take by mouth daily, Disp: , Rfl:   •  ibuprofen (MOTRIN) 200 mg tablet, Take 200 mg by mouth every 6 (six) hours as needed, Disp: , Rfl:   •  magnesium 30 MG tablet, Take 30 mg by mouth 2 (two) times a day, Disp: , Rfl:   •  metroNIDAZOLE (METROGEL) 0 75 % vaginal gel, Insert 1 application into the vagina daily at bedtime Insert one application nightly for 5 nights, then twice weekly for 4-6 months, Disp: 70 g, Rfl: 3  •  multivitamin (THERAGRAN) TABS, Take 1 tablet by mouth daily, Disp: , Rfl:   •  naproxen (NAPROSYN) 500 mg tablet, Take 1 tablet by mouth as needed, Disp: , Rfl:   •  Omega-3 Fatty Acids (FISH OIL PO), Take by mouth, Disp: , Rfl:   •  albuterol (PROVENTIL HFA,VENTOLIN HFA) 90 mcg/act inhaler, Inhale 2 puffs every 6 (six) hours as needed for wheezing (Patient not taking: Reported on 2019), Disp: 1 Inhaler, Rfl: 0    No Known Allergies        Review of Systems   Constitutional: Negative for appetite change, chills and fever  Eyes: Negative for visual disturbance  Respiratory: Negative for cough, chest tightness and shortness of breath  Cardiovascular: Negative for chest pain  Gastrointestinal: Negative for abdominal distention, abdominal pain, constipation, diarrhea, nausea and vomiting  Endocrine: Negative for cold intolerance and heat intolerance  Genitourinary: Positive for vaginal discharge  Negative for difficulty urinating, dyspareunia, dysuria, frequency, genital sores, pelvic pain, urgency, vaginal bleeding and vaginal pain  Musculoskeletal: Negative for arthralgias  Neurological: Negative for light-headedness and headaches  Hematological: Does not bruise/bleed easily  Psychiatric/Behavioral: Negative for behavioral problems  All other systems reviewed and are negative  /64 (BP Location: Right arm, Patient Position: Sitting, Cuff Size: Adult)   Pulse 74   Temp 97 5 °F (36 4 °C) (Tympanic)   Ht 5' 8" (1 727 m)   Wt 60 8 kg (134 lb)   LMP 11/28/2022 (Exact Date)   SpO2 99%   BMI 20 37 kg/m²         Physical Exam  Constitutional:       General: She is not in acute distress  Appearance: Normal appearance  Genitourinary:      Vulva, bladder and urethral meatus normal       No lesions in the vagina  Right Labia: No rash, tenderness, lesions or skin changes  Left Labia: No tenderness, lesions, skin changes or rash  No labial fusion noted  Vulva exam comments: +vitiligo   No inguinal adenopathy present in the right or left side  Vaginal discharge present  No vaginal erythema, tenderness, bleeding or ulceration  No vaginal prolapse present  Vaginal exam comments: Homogenous white discharge  Right Adnexa: not tender, not full and no mass present  Left Adnexa: not tender, not full and no mass present  No cervical motion tenderness, discharge, friability, lesion or polyp  Uterus is not enlarged, fixed, tender or irregular  No uterine mass detected  Uterus is anteverted  Pelvic exam was performed with patient in the lithotomy position  Breasts:     Right: No swelling, bleeding, inverted nipple, mass, nipple discharge, skin change or tenderness  Left: No swelling, bleeding, inverted nipple, mass, nipple discharge, skin change or tenderness     HENT: Head: Normocephalic and atraumatic  Neck:      Thyroid: No thyromegaly  Cardiovascular:      Rate and Rhythm: Normal rate and regular rhythm  Pulmonary:      Effort: Pulmonary effort is normal  No accessory muscle usage or respiratory distress  Abdominal:      General: There is no distension  Palpations: Abdomen is soft  Tenderness: There is no abdominal tenderness  There is no guarding or rebound  Musculoskeletal:         General: Normal range of motion  Cervical back: Normal range of motion and neck supple  Lymphadenopathy:      Upper Body:      Right upper body: No supraclavicular or axillary adenopathy  Left upper body: No supraclavicular or axillary adenopathy  Lower Body: No right inguinal and no right inguinal adenopathy  No left inguinal and no left inguinal adenopathy  Neurological:      General: No focal deficit present  Mental Status: She is alert  Skin:     General: Skin is warm and dry  Findings: No erythema  Psychiatric:         Mood and Affect: Mood normal          Behavior: Behavior normal    Vitals and nursing note reviewed  Exam conducted with a chaperone present  Results for orders placed or performed in visit on 12/12/22   POCT wet mount   Result Value Ref Range    WET MOUNT -     Yeast, Wet Prep neg     pH elevated     Whiff Test -     Clue Cells present     Trich, Wet Prep absent          Encounter for annual routine gynecological examination  - Discussed ACOG guidelines for pap smear screening frequency: performed today  - Discussed healthy lifestyle recommendations for diet, exercise and self breast awareness   - Discussed ACOG recommendations for screening mammograms: no prior, reviewed rationale for screening, pt is reticent due to having no family history and questioning necessity  Discussed limitations of self or clinical breast exam and that screening is recommended to start at age 36, pt to consider   Order for screening mammogram placed and encouraged f/u if she desires to discuss further   - Discussed age based recommendations for adequate calcium and vitamin D intake  No additional osteoporosis screening indicated at this time  - Discussed ACOG recommendations for colon cancer screening: not indicated at this time  - Safe sex practices were discussed and STI testing was desired, GC/CT collected  - Contraceptive options were reviewed: s/p BTL   - Routine follow up in 1 year was recommended or sooner as needed  All questions and concerns were addressed  Bacterial vaginosis  Exam findings today consistent with bacterial vaginosis infection  Recommend treatment with metrogel  Discussed suppression given h/o multiple recurrences  Vulvar hygiene measures and medication instructions reviewed

## 2022-12-18 LAB
LAB AP GYN PRIMARY INTERPRETATION: NORMAL
Lab: NORMAL

## 2023-01-18 ENCOUNTER — TELEPHONE (OUTPATIENT)
Dept: OBGYN CLINIC | Facility: CLINIC | Age: 43
End: 2023-01-18

## 2023-01-18 NOTE — TELEPHONE ENCOUNTER
Pt called, asking if we could prescribe a medication for pts  for BV, as she thinks that she is getting this from him  Advised her that he would have to see his dr, we can only prescribe medication to our patients

## 2023-01-23 ENCOUNTER — OFFICE VISIT (OUTPATIENT)
Dept: OBGYN CLINIC | Facility: CLINIC | Age: 43
End: 2023-01-23

## 2023-01-23 ENCOUNTER — APPOINTMENT (OUTPATIENT)
Dept: LAB | Facility: MEDICAL CENTER | Age: 43
End: 2023-01-23

## 2023-01-23 VITALS
HEIGHT: 68 IN | DIASTOLIC BLOOD PRESSURE: 62 MMHG | WEIGHT: 131.2 LBS | TEMPERATURE: 98.6 F | SYSTOLIC BLOOD PRESSURE: 94 MMHG | HEART RATE: 68 BPM | BODY MASS INDEX: 19.88 KG/M2

## 2023-01-23 DIAGNOSIS — Z11.3 SCREENING EXAMINATION FOR SEXUALLY TRANSMITTED DISEASE: ICD-10-CM

## 2023-01-23 DIAGNOSIS — Z11.3 SCREENING EXAMINATION FOR SEXUALLY TRANSMITTED DISEASE: Primary | ICD-10-CM

## 2023-01-23 NOTE — PROGRESS NOTES
Subjective   Patient ID: Aaron Groves is a 43 y o  female  Patient is here for a problem visit  Chief Complaint   Patient presents with   • Exposure to STD     Pt states that she would like HIV testing, as  was unfaithful and did not use protection  No sx currently     Seen last month as new patient for annual exam  Dx with BV - had discussed suppressive therapy with metrogel  GC/CT testing negative that visit   [place] recently that  had another partner and was not using condoms for past 15 months  Desires STI testing and bloodwork  Feels like BV sx would recur when was sexually active with her      Menstrual History:  OB History        2    Para   1    Term   1            AB   1    Living   1       SAB   1    IAB        Ectopic        Multiple        Live Births   1                Patient's last menstrual period was 2023 (exact date)           Past Medical History:   Diagnosis Date   • Carpal tunnel syndrome    • GERD (gastroesophageal reflux disease)    • Measles    • Varicella    • Vitiligo        Past Surgical History:   Procedure Laterality Date   •  SECTION     • COLPOSCOPY     • TOOTH EXTRACTION     • TUBAL LIGATION         Social History     Tobacco Use   • Smoking status: Never   • Smokeless tobacco: Never   Vaping Use   • Vaping Use: Never used   Substance Use Topics   • Alcohol use: No   • Drug use: No        No Known Allergies      Current Outpatient Medications:   •  Ascorbic Acid (VITAMIN C) 100 MG tablet, Take 100 mg by mouth daily, Disp: , Rfl:   •  Cholecalciferol (VITAMIN D) 50 MCG (2000 UT) CAPS, Take by mouth, Disp: , Rfl:   •  cyanocobalamin (VITAMIN B-12) 100 mcg tablet, Take by mouth daily, Disp: , Rfl:   •  ibuprofen (MOTRIN) 200 mg tablet, Take 200 mg by mouth every 6 (six) hours as needed, Disp: , Rfl:   •  magnesium 30 MG tablet, Take 30 mg by mouth 2 (two) times a day, Disp: , Rfl:   •  metroNIDAZOLE (METROGEL) 0 75 % vaginal gel, Insert 1 application into the vagina daily at bedtime Insert one application nightly for 5 nights, then twice weekly for 4-6 months, Disp: 70 g, Rfl: 3  •  multivitamin (THERAGRAN) TABS, Take 1 tablet by mouth daily, Disp: , Rfl:   •  naproxen (NAPROSYN) 500 mg tablet, Take 1 tablet by mouth as needed, Disp: , Rfl:   •  Omega-3 Fatty Acids (FISH OIL PO), Take by mouth, Disp: , Rfl:   •  albuterol (PROVENTIL HFA,VENTOLIN HFA) 90 mcg/act inhaler, Inhale 2 puffs every 6 (six) hours as needed for wheezing (Patient not taking: Reported on 11/22/2019), Disp: 1 Inhaler, Rfl: 0      Review of Systems   Constitutional: Negative for appetite change, chills and fever  Eyes: Negative for visual disturbance  Respiratory: Negative for cough, chest tightness and shortness of breath  Cardiovascular: Negative for chest pain  Gastrointestinal: Negative for abdominal distention, abdominal pain, constipation, diarrhea, nausea and vomiting  Endocrine: Negative for cold intolerance and heat intolerance  Genitourinary: Negative for difficulty urinating, dyspareunia, dysuria, frequency, genital sores, pelvic pain, urgency, vaginal bleeding, vaginal discharge and vaginal pain  Musculoskeletal: Negative for arthralgias  Neurological: Negative for light-headedness and headaches  Hematological: Does not bruise/bleed easily  Psychiatric/Behavioral: Negative for behavioral problems  All other systems reviewed and are negative  BP 94/62 (BP Location: Right arm, Patient Position: Sitting, Cuff Size: Adult)   Pulse 68   Temp 98 6 °F (37 °C) (Tympanic)   Ht 5' 8" (1 727 m)   Wt 59 5 kg (131 lb 3 2 oz)   LMP 01/22/2023 (Exact Date)   BMI 19 95 kg/m²       Physical Exam  Constitutional:       General: She is not in acute distress  Appearance: Normal appearance  HENT:      Head: Normocephalic and atraumatic  Cardiovascular:      Rate and Rhythm: Normal rate     Pulmonary:      Effort: Pulmonary effort is normal  No respiratory distress  Neurological:      General: No focal deficit present  Mental Status: She is alert  Psychiatric:         Mood and Affect: Mood normal          Behavior: Behavior normal    Vitals and nursing note reviewed  Appropriate laboratory testing, imaging studies, and prior external records were reviewed:     Assessment/Plan:       Problem List Items Addressed This Visit        Other    Screening examination for sexually transmitted disease - Primary     Patient desires screening for sexually transmitted infections  Testing sent for gonorrhea, chlamydia, and trichomonas  Patient also desires testing for HIV, hepatitis B, hepatitis C, and syphilis  Safe sex practices including condom use reviewed              Relevant Orders    HIV 1/2 AG/AB w Reflex [facility] for 2 yr old and above    RPR    Hepatitis C antibody    Hepatitis B surface antigen    Chlamydia/GC amplified DNA by PCR    Trichomonas vaginalis/Mycoplasma genitalium PCR

## 2023-01-24 LAB
C TRACH DNA SPEC QL NAA+PROBE: NEGATIVE
HBV SURFACE AG SER QL: NORMAL
HCV AB SER QL: NORMAL
HIV 1+2 AB+HIV1 P24 AG SERPL QL IA: NORMAL
HIV 2 AB SERPL QL IA: NORMAL
HIV1 AB SERPL QL IA: NORMAL
HIV1 P24 AG SERPL QL IA: NORMAL
M GENITALIUM DNA SPEC QL NAA+PROBE: NEGATIVE
N GONORRHOEA DNA SPEC QL NAA+PROBE: NEGATIVE
RPR SER QL: NORMAL
T VAGINALIS DNA SPEC QL NAA+PROBE: NEGATIVE

## 2023-03-24 PROBLEM — Z11.3 SCREENING EXAMINATION FOR SEXUALLY TRANSMITTED DISEASE: Status: RESOLVED | Noted: 2023-01-23 | Resolved: 2023-03-24

## 2023-11-08 ENCOUNTER — TELEPHONE (OUTPATIENT)
Dept: NEUROLOGY | Facility: CLINIC | Age: 43
End: 2023-11-08

## 2024-02-21 PROBLEM — Z01.419 ENCOUNTER FOR ANNUAL ROUTINE GYNECOLOGICAL EXAMINATION: Status: RESOLVED | Noted: 2022-12-12 | Resolved: 2024-02-21

## 2024-02-21 PROBLEM — Z01.419 VISIT FOR GYNECOLOGIC EXAMINATION: Status: RESOLVED | Noted: 2018-04-23 | Resolved: 2024-02-21

## 2024-06-03 ENCOUNTER — OFFICE VISIT (OUTPATIENT)
Dept: ENDOCRINOLOGY | Facility: CLINIC | Age: 44
End: 2024-06-03
Payer: COMMERCIAL

## 2024-06-03 VITALS
HEART RATE: 73 BPM | OXYGEN SATURATION: 99 % | DIASTOLIC BLOOD PRESSURE: 70 MMHG | BODY MASS INDEX: 21.9 KG/M2 | SYSTOLIC BLOOD PRESSURE: 116 MMHG | WEIGHT: 144 LBS

## 2024-06-03 DIAGNOSIS — E01.0 THYROMEGALY: Primary | ICD-10-CM

## 2024-06-03 DIAGNOSIS — E55.9 VITAMIN D DEFICIENCY: ICD-10-CM

## 2024-06-03 DIAGNOSIS — R53.83 OTHER FATIGUE: ICD-10-CM

## 2024-06-03 PROCEDURE — 99204 OFFICE O/P NEW MOD 45 MIN: CPT | Performed by: INTERNAL MEDICINE

## 2024-06-03 NOTE — PROGRESS NOTES
Lee Isaac 44 y.o. female MRN: [id number]    Encounter: [id number]      Assessment & Plan     Assessment:  This is a 44 y.o.-year-old female with fatigue     Plan:  Diagnoses and all orders for this visit:    Thyromegaly  Obtain thyroid ultrasound to rule out thyroid nodules, Hashimoto thyroiditis.  -     US thyroid; Future    Other fatigue  Obtain TSH, free T4 thyroid antibodies, vitamin B12, iron panel a.m. cortisol, vitamin D level  Also discussed with patient that she should make a follow-up appointment with primary care physician for regular checkup and screenings.  Follow-up on the blood work and inform patient if she needs any replacement  -     T4, free; Future  -     TSH, 3rd generation; Future  -     Thyroid Antibodies Panel; Future  -     Vitamin B12; Future  -     Iron Panel (Includes Ferritin, Iron Sat%, Iron, and TIBC); Future  -     Cortisol Level,7-9 AM Specimen; Future  -     T4, free; Future  -     TSH, 3rd generation; Future  -     Vitamin D 25 hydroxy; Future    Vitamin D deficiency  Check vitamin D level, will replace as needed  -     Vitamin D 25 hydroxy; Future  -     Vitamin D 25 hydroxy; Future         CC:   Fatigue     History of Present Illness     HPI:    Lee Isaac is a 44-year-old female with medical history of chronic fatigue, who made appointment for evaluation of her symptoms such as fatigue, excessive sleepiness.  She has endometriosis, gets menstruation cycles regularlym every 4 weeks with heavy flow.  She denies family history of thyroid problems, cousin has vitiligo  She has 1 child, did not have any thyroid issues during pregnancy.  He complains of feeling tired and fatigue, which has been going on for last few years.  He also complains of cold intolerance, weight gain, excessive sleepiness during the day, constipation.    Denies taking over-the-counter herbal supplementation, biotin supplementation  Review of Systems   Constitutional:  Positive for fatigue. Negative  for activity change, diaphoresis, fever and unexpected weight change.   HENT: Negative.     Eyes:  Negative for visual disturbance.   Respiratory:  Negative for cough, chest tightness and shortness of breath.    Cardiovascular:  Negative for chest pain, palpitations and leg swelling.   Gastrointestinal:  Negative for abdominal pain, blood in stool, constipation, diarrhea, nausea and vomiting.   Endocrine: Negative for cold intolerance, heat intolerance, polydipsia, polyphagia and polyuria.   Genitourinary:  Negative for dysuria, enuresis, frequency and urgency.   Musculoskeletal:  Negative for arthralgias and myalgias.   Skin:  Negative for pallor, rash and wound.   Allergic/Immunologic: Negative.    Neurological:  Negative for dizziness, tremors, weakness and numbness.   Hematological: Negative.    Psychiatric/Behavioral: Negative.         Historical Information   Past Medical History:   Diagnosis Date    Carpal tunnel syndrome     GERD (gastroesophageal reflux disease)     Measles     Varicella     Vitiligo      Past Surgical History:   Procedure Laterality Date     SECTION      COLPOSCOPY      TOOTH EXTRACTION      TUBAL LIGATION       Social History   Social History     Substance and Sexual Activity   Alcohol Use No     Social History     Substance and Sexual Activity   Drug Use No     Social History     Tobacco Use   Smoking Status Never   Smokeless Tobacco Never     Family History: History reviewed. No pertinent family history.    Meds/Allergies   Current Outpatient Medications   Medication Sig Dispense Refill    Ascorbic Acid (VITAMIN C) 100 MG tablet Take 100 mg by mouth daily      Cholecalciferol (VITAMIN D) 50 MCG (2000 UT) CAPS Take by mouth      cyanocobalamin (VITAMIN B-12) 100 mcg tablet Take by mouth daily      ibuprofen (MOTRIN) 200 mg tablet Take 200 mg by mouth every 6 (six) hours as needed      magnesium 30 MG tablet Take 30 mg by mouth 2 (two) times a day      multivitamin (THERAGRAN)  "TABS Take 1 tablet by mouth daily      naproxen (NAPROSYN) 500 mg tablet Take 1 tablet by mouth as needed      Omega-3 Fatty Acids (FISH OIL PO) Take by mouth      albuterol (PROVENTIL HFA,VENTOLIN HFA) 90 mcg/act inhaler Inhale 2 puffs every 6 (six) hours as needed for wheezing (Patient not taking: Reported on 11/22/2019) 1 Inhaler 0    metroNIDAZOLE (METROGEL) 0.75 % vaginal gel Insert 1 application into the vagina daily at bedtime Insert one application nightly for 5 nights, then twice weekly for 4-6 months (Patient not taking: Reported on 6/3/2024) 70 g 3     No current facility-administered medications for this visit.     No Known Allergies    Objective   Vitals: Blood pressure 116/70, pulse 73, weight 65.3 kg (144 lb), SpO2 99%, not currently breastfeeding.    Physical Exam  Vitals reviewed.   Constitutional:       General: She is not in acute distress.     Appearance: Normal appearance. She is normal weight. She is not ill-appearing.   HENT:      Head: Normocephalic and atraumatic.      Nose: Nose normal.   Eyes:      Extraocular Movements: Extraocular movements intact.      Conjunctiva/sclera: Conjunctivae normal.   Pulmonary:      Effort: No respiratory distress.   Musculoskeletal:         General: Normal range of motion.      Cervical back: Normal range of motion.      Right lower leg: No edema.      Left lower leg: No edema.   Skin:     General: Skin is warm.      Findings: No rash.   Neurological:      General: No focal deficit present.      Mental Status: She is alert and oriented to person, place, and time.   Psychiatric:         Mood and Affect: Mood normal.         Behavior: Behavior normal.         The history was obtained from the review of the chart, patient.    Lab Results:        Imaging Studies:       I have personally reviewed pertinent reports.      Portions of the record may have been created with voice recognition software. Occasional wrong word or \"sound a like\" substitutions may have " occurred due to the inherent limitations of voice recognition software. Read the chart carefully and recognize, using context, where substitutions have occurred.

## 2024-06-04 ENCOUNTER — TELEPHONE (OUTPATIENT)
Dept: FAMILY MEDICINE CLINIC | Facility: CLINIC | Age: 44
End: 2024-06-04

## 2024-06-06 NOTE — TELEPHONE ENCOUNTER
06/06/24 10:34 AM        The office's request has been received, reviewed, and the patient chart updated. The PCP has successfully been removed with a patient attribution note. This message will now be completed.        Thank you  Raul Nash

## 2024-06-07 ENCOUNTER — LAB (OUTPATIENT)
Dept: LAB | Facility: MEDICAL CENTER | Age: 44
End: 2024-06-07
Payer: COMMERCIAL

## 2024-06-07 DIAGNOSIS — E55.9 VITAMIN D DEFICIENCY: ICD-10-CM

## 2024-06-07 DIAGNOSIS — R53.83 OTHER FATIGUE: ICD-10-CM

## 2024-06-07 LAB
25(OH)D3 SERPL-MCNC: 57.2 NG/ML (ref 30–100)
FERRITIN SERPL-MCNC: 21 NG/ML (ref 11–307)
IRON SATN MFR SERPL: 27 % (ref 15–50)
IRON SERPL-MCNC: 95 UG/DL (ref 50–212)
T4 FREE SERPL-MCNC: 0.89 NG/DL (ref 0.61–1.12)
TIBC SERPL-MCNC: 352 UG/DL (ref 250–450)
TSH SERPL DL<=0.05 MIU/L-ACNC: 1.65 UIU/ML (ref 0.45–4.5)
UIBC SERPL-MCNC: 257 UG/DL (ref 155–355)
VIT B12 SERPL-MCNC: 966 PG/ML (ref 180–914)

## 2024-06-07 PROCEDURE — 82306 VITAMIN D 25 HYDROXY: CPT

## 2024-06-07 PROCEDURE — 86800 THYROGLOBULIN ANTIBODY: CPT

## 2024-06-07 PROCEDURE — 36415 COLL VENOUS BLD VENIPUNCTURE: CPT

## 2024-06-07 PROCEDURE — 83540 ASSAY OF IRON: CPT

## 2024-06-07 PROCEDURE — 86376 MICROSOMAL ANTIBODY EACH: CPT

## 2024-06-07 PROCEDURE — 82728 ASSAY OF FERRITIN: CPT

## 2024-06-07 PROCEDURE — 83550 IRON BINDING TEST: CPT

## 2024-06-07 PROCEDURE — 84443 ASSAY THYROID STIM HORMONE: CPT

## 2024-06-07 PROCEDURE — 84439 ASSAY OF FREE THYROXINE: CPT

## 2024-06-07 PROCEDURE — 82607 VITAMIN B-12: CPT

## 2024-06-07 NOTE — RESULT ENCOUNTER NOTE
Hi  Your vitamin D is within normal range, continue current dose of vitamin D supplementation, thyroid profile is within normal range however antibodies are still pending  Vitamin B12 is slightly elevated, take vitamin B12 supplementation every other day  Will review thyroid antibodies and get back to you

## 2024-06-08 ENCOUNTER — APPOINTMENT (OUTPATIENT)
Dept: LAB | Facility: MEDICAL CENTER | Age: 44
End: 2024-06-08
Payer: COMMERCIAL

## 2024-06-08 DIAGNOSIS — R53.83 OTHER FATIGUE: ICD-10-CM

## 2024-06-08 LAB
CORTIS AM PEAK SERPL-MCNC: 11.4 UG/DL (ref 6.7–22.6)
THYROGLOB AB SERPL-ACNC: <1 IU/ML (ref 0–0.9)
THYROPEROXIDASE AB SERPL-ACNC: 346 IU/ML (ref 0–34)

## 2024-06-08 PROCEDURE — 36415 COLL VENOUS BLD VENIPUNCTURE: CPT

## 2024-06-08 PROCEDURE — 82533 TOTAL CORTISOL: CPT

## 2024-06-09 ENCOUNTER — HOSPITAL ENCOUNTER (OUTPATIENT)
Dept: ULTRASOUND IMAGING | Facility: HOSPITAL | Age: 44
Discharge: HOME | End: 2024-06-09
Attending: INTERNAL MEDICINE
Payer: COMMERCIAL

## 2024-06-09 DIAGNOSIS — E01.0 THYROMEGALY: ICD-10-CM

## 2024-06-09 PROCEDURE — 76536 US EXAM OF HEAD AND NECK: CPT

## 2024-06-17 ENCOUNTER — NURSE TRIAGE (OUTPATIENT)
Age: 44
End: 2024-06-17

## 2024-06-17 NOTE — TELEPHONE ENCOUNTER
----- Message from Nelia PARRA sent at 6/17/2024  3:23 PM EDT -----  Patient has vaginal itching and discharge. Please advise.

## 2024-06-17 NOTE — TELEPHONE ENCOUNTER
"Pt reports hx of recurrent BV. She has been having vaginal itching and white discharge. States sometimes it is thick but not always. Sometimes will have a foul odor. Denies any pain or rash. States she has tried OTC remedies with no relief. Pt given appointment for 6/20/24 and is thankful.      Reason for Disposition   Bad smelling vaginal discharge    Answer Assessment - Initial Assessment Questions  1. DISCHARGE: \"Describe the discharge.\" (e.g., white, yellow, green, gray, foamy, cottage cheese-like)      white  2. ODOR: \"Is there a bad odor?\"      sometimes  3. ONSET: \"When did the discharge begin?\"      States she has had it for awhile, tried OTC remedies with no relief.   4. RASH: \"Is there a rash in that area?\" If Yes, ask: \"Describe it.\" (e.g., redness, blisters, sores, bumps)      denies  5. ABDOMINAL PAIN: \"Are you having any abdominal pain?\" If Yes, ask: \"What does it feel like? \" (e.g., crampy, dull, intermittent, constant)       denies  6. ABDOMINAL PAIN SEVERITY: If present, ask: \"How bad is it?\"  (e.g., mild, moderate, severe)   - MILD - doesn't interfere with normal activities    - MODERATE - interferes with normal activities or awakens from sleep    - SEVERE - patient doesn't want to move (R/O peritonitis)       denies  7. CAUSE: \"What do you think is causing the discharge?\" \"Have you had the same problem before? What happened then?\"      BV  8. OTHER SYMPTOMS: \"Do you have any other symptoms?\" (e.g., fever, itching, vaginal bleeding, pain with urination, injury to genital area, vaginal foreign body)      denies  9. PREGNANCY: \"Is there any chance you are pregnant?\" \"When was your last menstrual period?\"      LMP last week    Protocols used: Vaginal Discharge-ADULT-[place]    "

## 2024-06-20 ENCOUNTER — APPOINTMENT (OUTPATIENT)
Dept: LAB | Facility: CLINIC | Age: 44
End: 2024-06-20
Payer: COMMERCIAL

## 2024-06-20 ENCOUNTER — OFFICE VISIT (OUTPATIENT)
Dept: OBGYN CLINIC | Facility: CLINIC | Age: 44
End: 2024-06-20
Payer: COMMERCIAL

## 2024-06-20 VITALS
DIASTOLIC BLOOD PRESSURE: 64 MMHG | HEIGHT: 68 IN | WEIGHT: 144.6 LBS | OXYGEN SATURATION: 98 % | BODY MASS INDEX: 21.92 KG/M2 | SYSTOLIC BLOOD PRESSURE: 114 MMHG | HEART RATE: 95 BPM

## 2024-06-20 DIAGNOSIS — Z11.3 SCREEN FOR STD (SEXUALLY TRANSMITTED DISEASE): ICD-10-CM

## 2024-06-20 DIAGNOSIS — Z11.3 SCREEN FOR STD (SEXUALLY TRANSMITTED DISEASE): Primary | ICD-10-CM

## 2024-06-20 DIAGNOSIS — N76.0 RECURRENT VAGINITIS: ICD-10-CM

## 2024-06-20 LAB
BV WHIFF TEST VAG QL: ABNORMAL
CLUE CELLS SPEC QL WET PREP: PRESENT
HBV SURFACE AG SER QL: NORMAL
HCV AB SER QL: NORMAL
PH SMN: 4.5 [PH]
T VAGINALIS VAG QL WET PREP: ABNORMAL
YEAST VAG QL WET PREP: ABNORMAL

## 2024-06-20 PROCEDURE — 86803 HEPATITIS C AB TEST: CPT

## 2024-06-20 PROCEDURE — 87660 TRICHOMONAS VAGIN DIR PROBE: CPT | Performed by: OBSTETRICS & GYNECOLOGY

## 2024-06-20 PROCEDURE — 86780 TREPONEMA PALLIDUM: CPT

## 2024-06-20 PROCEDURE — 87340 HEPATITIS B SURFACE AG IA: CPT

## 2024-06-20 PROCEDURE — 87389 HIV-1 AG W/HIV-1&-2 AB AG IA: CPT

## 2024-06-20 PROCEDURE — 87591 N.GONORRHOEAE DNA AMP PROB: CPT | Performed by: OBSTETRICS & GYNECOLOGY

## 2024-06-20 PROCEDURE — 87210 SMEAR WET MOUNT SALINE/INK: CPT | Performed by: OBSTETRICS & GYNECOLOGY

## 2024-06-20 PROCEDURE — 87480 CANDIDA DNA DIR PROBE: CPT | Performed by: OBSTETRICS & GYNECOLOGY

## 2024-06-20 PROCEDURE — 36415 COLL VENOUS BLD VENIPUNCTURE: CPT

## 2024-06-20 PROCEDURE — 86695 HERPES SIMPLEX TYPE 1 TEST: CPT

## 2024-06-20 PROCEDURE — 87491 CHLMYD TRACH DNA AMP PROBE: CPT | Performed by: OBSTETRICS & GYNECOLOGY

## 2024-06-20 PROCEDURE — 87510 GARDNER VAG DNA DIR PROBE: CPT | Performed by: OBSTETRICS & GYNECOLOGY

## 2024-06-20 PROCEDURE — 86696 HERPES SIMPLEX TYPE 2 TEST: CPT

## 2024-06-20 PROCEDURE — 99214 OFFICE O/P EST MOD 30 MIN: CPT | Performed by: OBSTETRICS & GYNECOLOGY

## 2024-06-20 RX ORDER — METRONIDAZOLE 7.5 MG/G
1 GEL VAGINAL 2 TIMES WEEKLY
Qty: 70 G | Refills: 3 | Status: SHIPPED | OUTPATIENT
Start: 2024-06-20

## 2024-06-20 RX ORDER — TINIDAZOLE 500 MG/1
500 TABLET ORAL 2 TIMES DAILY
Qty: 10 TABLET | Refills: 0 | Status: SHIPPED | OUTPATIENT
Start: 2024-06-20 | End: 2024-06-25

## 2024-06-20 NOTE — PROGRESS NOTES
Assessment/Plan:  Problem List Items Addressed This Visit    None  Visit Diagnoses       Screen for STD (sexually transmitted disease)    -  Primary    Relevant Orders    Chlamydia/GC amplified DNA by PCR    POCT wet mount    VAGINOSIS DNA PROBE    Herpes I/II IgG Antibodies    Hepatitis C antibody    Hepatitis B surface antigen    RPR-Syphilis Screening (Total Syphilis IGG/IGM)    HIV 1/2 AG/AB w Reflex [facility] for 2 yr old and above    Recurrent vaginitis        Relevant Orders    Chlamydia/GC amplified DNA by PCR    POCT wet mount    VAGINOSIS DNA PROBE           Patient with recurrent vaginitis.  Discussed with patient use of probiotics and use of condoms.  She was advised TINIDAZOLE BID for 5 days and metronidazole twice weekly vaginal gel for suppression.  She is frustrated by multiple bouts of a bacterial vaginosis and wonders if her partner needs treatment.  Discussed that treatment or partner's is not recommended that his partner may seek consultation from his own family doctor.  I offered patient to be seen at vaginitis clinic in [place], she declines this for now.  She request STD screening which was ordered and performed.    Follow-up in 1 month for annual GYN exam.      Subjective   Patient ID: Lee Isaac is a 44 y.o. female.    Patient is here for a problem visit.    Chief Complaint   Patient presents with    Follow-up     Reoccurring BV, Itching, discharge, odor.  No issues with urination.      H/o recurrent BV since  using metrogel as needed.  She reports the week before the period and during her peirod, she has burning and itching. She reports an odor.  She reports discharge is white/yellow.  She is sexually active, she has been with partner for 18 years.  She states her partner was cheating last year.  She had STD screening last year.      Menstrual History:  OB History          1    Para   1    Term   1       0    AB   0    Living   1         SAB   0    IAB   0    Ectopic    0    Multiple   0    Live Births   1                Patient's last menstrual period was 2024 (approximate).     Tubal ligation    Past Medical History:   Diagnosis Date    Carpal tunnel syndrome     GERD (gastroesophageal reflux disease)     Measles     Varicella     Vitiligo        Past Surgical History:   Procedure Laterality Date     SECTION      COLPOSCOPY      TOOTH EXTRACTION      TUBAL LIGATION         Social History     Tobacco Use    Smoking status: Never    Smokeless tobacco: Never   Vaping Use    Vaping status: Never Used   Substance Use Topics    Alcohol use: No    Drug use: No        No Known Allergies      Current Outpatient Medications:     albuterol (PROVENTIL HFA,VENTOLIN HFA) 90 mcg/act inhaler, Inhale 2 puffs every 6 (six) hours as needed for wheezing (Patient not taking: Reported on 2019), Disp: 1 Inhaler, Rfl: 0    Ascorbic Acid (VITAMIN C) 100 MG tablet, Take 100 mg by mouth daily, Disp: , Rfl:     Cholecalciferol (VITAMIN D) 50 MCG (2000 UT) CAPS, Take by mouth, Disp: , Rfl:     cyanocobalamin (VITAMIN B-12) 100 mcg tablet, Take by mouth daily, Disp: , Rfl:     ibuprofen (MOTRIN) 200 mg tablet, Take 200 mg by mouth every 6 (six) hours as needed, Disp: , Rfl:     magnesium 30 MG tablet, Take 30 mg by mouth 2 (two) times a day, Disp: , Rfl:     metroNIDAZOLE (METROGEL) 0.75 % vaginal gel, Insert 1 application into the vagina daily at bedtime Insert one application nightly for 5 nights, then twice weekly for 4-6 months (Patient not taking: Reported on 6/3/2024), Disp: 70 g, Rfl: 3    multivitamin (THERAGRAN) TABS, Take 1 tablet by mouth daily, Disp: , Rfl:     naproxen (NAPROSYN) 500 mg tablet, Take 1 tablet by mouth as needed, Disp: , Rfl:     Omega-3 Fatty Acids (FISH OIL PO), Take by mouth, Disp: , Rfl:       Pertinent laboratory testing, imaging studies and prior external records reviewed    Review of Systems   Constitutional: Negative.    HENT: Negative.     Eyes:  "Negative.    Respiratory: Negative.     Cardiovascular: Negative.    Gastrointestinal: Negative.    Endocrine: Negative.    Genitourinary:         As noted in HPI   Musculoskeletal: Negative.    Skin: Negative.    Allergic/Immunologic: Negative.    Neurological: Negative.    Hematological: Negative.    Psychiatric/Behavioral: Negative.           /64 (BP Location: Right arm, Patient Position: Sitting, Cuff Size: Adult)   Pulse 95   Ht 5' 8\" (1.727 m)   Wt 65.6 kg (144 lb 9.6 oz)   LMP 06/08/2024 (Approximate)   SpO2 98%   BMI 21.99 kg/m²       Physical Exam  Constitutional:       General: She is not in acute distress.     Appearance: She is well-developed.   Genitourinary:      Bladder and urethral meatus normal.      No lesions in the vagina.      Right Labia: No rash, tenderness, lesions, skin changes or Bartholin's cyst.     Left Labia: No tenderness, lesions, skin changes, Bartholin's cyst or rash.     No vaginal discharge, erythema, tenderness or bleeding.      No vaginal prolapse present.     No vaginal atrophy present.       Right Adnexa: not tender, not full and no mass present.     Left Adnexa: not tender, not full and no mass present.     No cervical polyp.      Uterus is not enlarged or tender.      No uterine mass detected.     Pelvic exam was performed with patient in the lithotomy position.   Rectum:      No tenderness.   Cardiovascular:      Rate and Rhythm: Normal rate.   Pulmonary:      Effort: Pulmonary effort is normal.   Abdominal:      General: There is no distension.      Palpations: Abdomen is soft.      Tenderness: There is no abdominal tenderness.   Neurological:      Mental Status: She is alert and oriented to person, place, and time.   Skin:     General: Skin is warm and dry.   Psychiatric:         Behavior: Behavior normal.             Future Appointments   Date Time Provider Department Center   12/9/2024  9:00 AM Leslie Fisher PA-C [facility]       "

## 2024-06-21 LAB
C TRACH DNA SPEC QL NAA+PROBE: NEGATIVE
CANDIDA RRNA VAG QL PROBE: NOT DETECTED
G VAGINALIS RRNA GENITAL QL PROBE: DETECTED
HIV 1+2 AB+HIV1 P24 AG SERPL QL IA: NORMAL
HIV 2 AB SERPL QL IA: NORMAL
HIV1 AB SERPL QL IA: NORMAL
HIV1 P24 AG SERPL QL IA: NORMAL
N GONORRHOEA DNA SPEC QL NAA+PROBE: NEGATIVE
T VAGINALIS RRNA GENITAL QL PROBE: NOT DETECTED
TREPONEMA PALLIDUM IGG+IGM AB [PRESENCE] IN SERUM OR PLASMA BY IMMUNOASSAY: NORMAL

## 2024-06-25 LAB — SCAN RESULT: NORMAL

## 2024-06-28 ENCOUNTER — TELEPHONE (OUTPATIENT)
Age: 44
End: 2024-06-28

## 2024-06-28 NOTE — TELEPHONE ENCOUNTER
Pt returned call. Reviewed lab results note from Dr. Dobson. Pt wanted to know how she got the exposure to type 1 herpes, reviewed it is from coming in contact with someone who has a herpes sore. Pt also wondering if there is anything she needs to do at this time, advised not unless she would notice an active sore. Pt verbalized understanding and is thankful.

## 2024-06-28 NOTE — TELEPHONE ENCOUNTER
----- Message from Linda Dobson MD sent at 6/26/2024  6:11 PM EDT -----  Notify patient that she had labs that showed Hepatitis B and C negative. Syphilis and HIV test negative.     Herpes testing was negative for genital herpes but shows exposure to type 1 or oral herpes that can cause cold sores and not considered an STD

## 2024-07-16 ENCOUNTER — OFFICE VISIT (OUTPATIENT)
Dept: NEUROLOGY | Facility: CLINIC | Age: 44
End: 2024-07-16
Payer: COMMERCIAL

## 2024-07-16 VITALS
SYSTOLIC BLOOD PRESSURE: 116 MMHG | HEIGHT: 68 IN | BODY MASS INDEX: 22.14 KG/M2 | DIASTOLIC BLOOD PRESSURE: 62 MMHG | TEMPERATURE: 98.1 F | OXYGEN SATURATION: 98 % | WEIGHT: 146.1 LBS | HEART RATE: 76 BPM

## 2024-07-16 DIAGNOSIS — R20.2 PARESTHESIA: ICD-10-CM

## 2024-07-16 DIAGNOSIS — G43.709 CHRONIC MIGRAINE WITHOUT AURA WITHOUT STATUS MIGRAINOSUS, NOT INTRACTABLE: Primary | ICD-10-CM

## 2024-07-16 PROCEDURE — 99204 OFFICE O/P NEW MOD 45 MIN: CPT

## 2024-07-16 RX ORDER — TOPIRAMATE 25 MG/1
TABLET ORAL
Qty: 120 TABLET | Refills: 0 | Status: SHIPPED | OUTPATIENT
Start: 2024-07-16

## 2024-07-16 RX ORDER — SUMATRIPTAN 100 MG/1
100 TABLET, FILM COATED ORAL AS NEEDED
Qty: 18 TABLET | Refills: 2 | Status: SHIPPED | OUTPATIENT
Start: 2024-07-16

## 2024-07-16 NOTE — PROGRESS NOTES
[facility]  PATIENT:  Lee Isaac  MRN:  [id number]  :  1980  DATE OF SERVICE:  2024  REFERRED BY: Self, Referral  PCP: No primary care provider on file.    Assessment/Plan:     Chronic migraine without aura without status migrainosus, not intractable  Patient stated for about 12 years she has had headache/migraines but recently the headaches have become more frequent over the past 3 years or so.  They are now occurring on a daily basis.  She will use Advil which will help eliminate the headache but then it comes back once the medication wears off.  Her headaches do not appear to be positional in nature. She has been taking ibuprofen on a daily basis for awhile now.  Could have a component of medication overuse headache.   Workup:  Due to increased frequency and severity of headaches and migraines I recommend further evaluation with MRI brain without contrast to rule out structural or treatable causes of symptoms   Preventative:  We discussed headache hygiene and lifestyle factors that may improve headaches  Start Topamax 25mg nightly for one week,then increase to 50mg nightly for 1 week, then increase to 75mg nightly for 1 week, then continue at 100mg nightly   Past/ failed/contraindicated: none  Future options: Propranolol, amitriptyline, CGRP med, botox  Acute:  Discussed not taking over-the-counter or prescription pain medications more than 3 days per week to prevent medication overuse/rebound headache  Start Imitrex 100mg at the earliest onset of a migraine.  May repeat again in 2 hours if not completely headache free. No more than 2 tabs in 24 hours  Past/ failed/contraindicated: Naproxen (for carpal tunnel in past but was effective)  Future options:  Other triptan (Maxalt), ubrelvy, reyvow, nurtec       Diagnoses and all orders for this visit:    Chronic migraine without aura without status migrainosus, not intractable  -     SUMAtriptan (IMITREX) 100 mg tablet; Take 1  tablet (100 mg total) by mouth as needed for migraine May repeat in 2 hours. Limit of 2/day or 18/month  -     topiramate (Topamax) 25 mg tablet; Take 25mg nightly for 1 week, then increase to 50mg nightly for 1 week, then increase to 75mg nightly for 1 week, then continue at 100mg nightly.  -     MRI brain without contrast; Future    Paresthesia  -     MRI brain without contrast; Future    Other orders  -     COLLAGEN PO; Take by mouth         Patient should follow up in 2 months, or I would be happy to see her sooner if needed.  Patient should call the office or send a [employer] message with any questions or concerns.  Patient should present to the nearest emergency department with any concerning symptoms.     CC:     We had the pleasure of evaluating Walkiris in neurological consultation today. she is a 44 y.o. year-old female who presents today for evaluation of headaches.     History obtained from patient as well as available medical record review.    History of Present Illness:     Patient stated for about 12 years she has had headache/migraines but recently the headaches have become more frequent over the past 3 years or so.  They are now occurring on a daily basis.  She will use Advil which will help eliminate the headache but then it comes back once the medication wears off.  Her headaches do not appear to be positional in nature.    She has been taking ibuprofen on a daily basis for awhile now.  Could have a component of medication overuse headache.     Headaches started at what age? 32 years old  How often do the headaches occur? Daily headache. Will take some advil but then the headaches can progress throughout the day.   - as of 7/18/2024:   What time of the day do the headaches start?  No particular time of day   How long do the headaches last? Hours to entire day  Are you ever headache free? Yes, rarely.    Aura? without aura     Last eye exam: about 1 year ago she is due for one.     Where is your  headache located and pain quality? Throbbing, pressure, dull, achy sometimes just on the left side.  Other days it will start in the neck and will radiate up.   Also has tingling at the vertex of her head which started about 4 months ago.      What is the intensity of pain? Average: 3-5/10, worst 10/10  Associated symptoms:   [x] Nausea       [] Vomiting        [] Diarrhea  [] Insomnia    [] Stiff or sore neck   [x] Problems with concentration  [x] Photophobia     [x]Phonophobia      [] Osmophobia  [] Blurred vision   [] Prefer quiet, dark room  [x] Light-headed or dizzy     [x] Tinnitus   [] Hands or feet tingle or feel numb/paresthesias    [] Ptosis      [] Facial droop  [] Lacrimation  [] Nasal congestion/rhinorrhea   [] Flushing of face      Things that make the headache worse? Any movement makes it worse.     Headache triggers: Stress, Chewing, exercise, fatigue, menstruation. Not aware of any foods or drinks.     Have you seen someone else for headaches or pain? No  Have you had trigger point injection performed and how often? No  Have you had Botox injection performed and how often? No   Have you had epidural injections or transforaminal injections performed? No  Are you current pregnant or planning on getting pregnant? No, tubal ligation  Have you ever had any Brain imaging? no    LIFESTYLE  Sleep   Averages: 3-4 hours per night  Problems falling asleep?:   Yes  Problems staying asleep?:  Yes  Do you snore while asleep? No  Do you wake up with headaches? No  Water: 1 gallon per day when not traveling for work.   Caffeine: 1 k-cup per day  Mood: Not currently but has in the past.     The following portions of the patient's history were reviewed and updated as appropriate: allergies, current medications, past family history, past medical history, past social history, past surgical history and problem list.    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   chiropractic care      Past Medical History:     Past Medical History:   Diagnosis Date    Carpal tunnel syndrome     GERD (gastroesophageal reflux disease)     Measles     Varicella     Vitiligo        Past Surgical History:   Procedure Laterality Date     SECTION      COLPOSCOPY      TOOTH EXTRACTION      TUBAL LIGATION         Patient Active Problem List   Diagnosis    Sore throat    Pelvic cramping    Bacterial vaginosis    Chronic migraine without aura without status migrainosus, not intractable       Medications:     Current Outpatient Medications   Medication Sig Dispense Refill    Ascorbic Acid (VITAMIN C) 100 MG tablet Take 100 mg by mouth daily      Cholecalciferol (VITAMIN D) 50 MCG (2000 UT) CAPS Take by mouth      COLLAGEN PO Take by mouth      cyanocobalamin (VITAMIN B-12) 100 mcg tablet Take by mouth daily      ibuprofen (MOTRIN) 200 mg tablet Take 200 mg by mouth every 6 (six) hours as needed      magnesium 30 MG tablet Take 30 mg by mouth 2 (two) times a day      metroNIDAZOLE (METROGEL) 0.75 % vaginal gel Insert 1 Application into the vagina 2 (two) times a week Insert 1 applicatorful PV 2x/week 70 g 3    multivitamin (THERAGRAN) TABS Take 1 tablet by mouth daily      Omega-3 Fatty Acids (FISH OIL PO) Take by mouth      SUMAtriptan (IMITREX)  100 mg tablet Take 1 tablet (100 mg total) by mouth as needed for migraine May repeat in 2 hours. Limit of 2/day or 18/month 18 tablet 2    topiramate (Topamax) 25 mg tablet Take 25mg nightly for 1 week, then increase to 50mg nightly for 1 week, then increase to 75mg nightly for 1 week, then continue at 100mg nightly. 120 tablet 0    albuterol (PROVENTIL HFA,VENTOLIN HFA) 90 mcg/act inhaler Inhale 2 puffs every 6 (six) hours as needed for wheezing (Patient not taking: Reported on 11/22/2019) 1 Inhaler 0    metroNIDAZOLE (METROGEL) 0.75 % vaginal gel Insert 1 application into the vagina daily at bedtime Insert one application nightly for 5 nights, then twice weekly for 4-6 months (Patient not taking: Reported on 6/3/2024) 70 g 3    naproxen (NAPROSYN) 500 mg tablet Take 1 tablet by mouth as needed (Patient not taking: Reported on 7/16/2024)       No current facility-administered medications for this visit.        Allergies:     No Known Allergies    Family History:     History reviewed. No pertinent family history.    Social History:     Social History     Socioeconomic History    Marital status: /Civil Union     Spouse name: Not on file    Number of children: Not on file    Years of education: Not on file    Highest education level: Not on file   Occupational History    Not on file   Tobacco Use    Smoking status: Never    Smokeless tobacco: Never   Vaping Use    Vaping status: Never Used   Substance and Sexual Activity    Alcohol use: No    Drug use: No    Sexual activity: Yes     Partners: Male     Birth control/protection: Female Sterilization   Other Topics Concern    Not on file   Social History Narrative    Not on file     Social Determinants of Health     Financial Resource Strain: Not on file   Food Insecurity: Not on file   Transportation Needs: Not on file   Physical Activity: Not on file   Stress: Not on file   Social Connections: Not on file   Intimate Partner Violence: Not on file   Housing  "Stability: Not on file       Objective:     Physical Exam:    Vitals:  /62 (BP Location: Left arm, Patient Position: Sitting, Cuff Size: Adult)   Pulse 76   Temp 98.1 °F (36.7 °C) (Temporal)   Ht 5' 8\" (1.727 m)   Wt 66.3 kg (146 lb 1.6 oz)   LMP 06/08/2024 (Approximate)   SpO2 98%   BMI 22.21 kg/m²   BP Readings from Last 3 Encounters:   07/16/24 116/62   06/20/24 114/64   06/03/24 116/70     Pulse Readings from Last 3 Encounters:   07/16/24 76   06/20/24 95   06/03/24 73     On neurological examination the patient was awake, alert, attentive, oriented to person, place, and time. Recent and remote memory intact to conversation with no evidence of language dysfunction. Satisfactory fund of knowledge. Normal attention span and concentration.  Mood, affect and judgement are appropriate. Speech is fluent without dysarthria or aphasia. Face appears symmetric, with no obvious weakness noted.  Audition is intact to casual conversation.  Eye movements are intact. Able to move bilateral upper extremities antigravity without difficulty.       Pertinent lab results:   Lab Results   Component Value Date    ILH3PNAQPMDB 1.649 06/07/2024       Lab Results   Component Value Date    GFYFEXJH36 966 (H) 06/07/2024    Vitamin D  57.2  6/7/24     Pertinent Imaging:   None to review     Review of Systems:     Constitutional:  Negative for appetite change, fatigue and fever.   HENT: Negative.  Negative for hearing loss, tinnitus, trouble swallowing and voice change.    Eyes:  Positive for visual disturbance (light sensitivity). Negative for photophobia and pain.   Respiratory: Negative.  Negative for shortness of breath.    Cardiovascular: Negative.  Negative for palpitations.   Gastrointestinal:  Positive for nausea. Negative for vomiting.   Endocrine: Negative.  Negative for cold intolerance.   Genitourinary: Negative.  Negative for dysuria, frequency and urgency.   Musculoskeletal:  Negative for back pain, gait problem, " myalgias, neck pain and neck stiffness.   Skin: Negative.  Negative for rash.   Allergic/Immunologic: Negative.    Neurological:  Positive for dizziness, light-headedness and headaches (daily but severity varies). Negative for tremors, seizures, syncope, facial asymmetry, speech difficulty, weakness and numbness.   Hematological: Negative.  Does not bruise/bleed easily.   Psychiatric/Behavioral: Negative.  Negative for confusion, hallucinations and sleep disturbance.    All other systems reviewed and are negative.    Reviewed ROS as entered by medical assistant.     I have spent a total time of 35 minutes on 07/18/24 in caring for this patient including Diagnostic results, Prognosis, Risks and benefits of tx options, Instructions for management, Patient and family education, Importance of tx compliance, Risk factor reductions, Impressions, Counseling / Coordination of care, Documenting in the medical record, Reviewing / ordering tests, medicine, procedures  , and Obtaining or reviewing history  .       Author:  CLAUDIA Acosta 7/18/2024 1:59 PM

## 2024-07-16 NOTE — PATIENT INSTRUCTIONS
Patient Instructions:    Additional Testing  -I am recommending further Neurodiagnostic workup at this time: MRI Brain ordered    Headache/migraine treatment:     Prevention  -To take every day to help prevent headaches - not to take at the time of headache:  -Start Topamax 25mg nightly for one week,then increase to 50mg nightly for 1 week, then increase to 75mg nightly for 1 week, then continue at 100mg nightly   -Over the counter preventive supplements for headaches/migraines (if you try, try for 3 months straight):  -Magnesium 400mg daily (If any diarrhea or upset stomach, decrease dose as tolerated)  -Riboflavin (Vitamin B2) 400mg daily (may make your urine bright/neon yellow)  - All supplements can be purchased online    Abortive  -For immediate treatment of a headache/migraine  -For your more moderate to severe migraines take this medication as early as possible:  -Start Imitrex 100mg at the onset of a migraine.  May repeat again in 2 hours if not completely headache free  -It is ok to take ibuprofen, acetaminophen or naproxen (Advil, Tylenol,  Aleve, Excedrin) if they help your headaches you should limit these to No more than 3 times a week to avoid medication overuse/rebound headaches.     Lifestyle Recommendations:    -Remain well-hydrated drinking at least 48 to 64 ounces of noncaffeinated beverages per day in addition to anything caffeinated.    -Getting adequate rest is also very important for migraine prevention (aim for 7-8 hours per night).     -Regular exercise is also beneficial for headache prevention.  I would encourage at the least 5 days of physical exercise weekly for at least 30 minutes.   -I would like for them to keep track of their migraines using an application on their phone or calendar as they see fit. Phone applications: Migraine Donny or Migraine Diary.    Education and Follow-up:    -Please call or send a [employer] message with any questions or concerns. Please present to the emergency  room with any concerning symptoms such as: worst headache of your life, sudden painless loss of vision or double vision, difficulty speaking or swallowing, vertigo/room spinning that does not quickly resolve, or weakness/numbness/loss of coordination affecting 1 side of the face or body.  -Follow up in 2 months or sooner if needed.

## 2024-07-16 NOTE — PROGRESS NOTES
Review of Systems   Constitutional:  Negative for appetite change, fatigue and fever.   HENT: Negative.  Negative for hearing loss, tinnitus, trouble swallowing and voice change.    Eyes:  Positive for visual disturbance (light sensitivity). Negative for photophobia and pain.   Respiratory: Negative.  Negative for shortness of breath.    Cardiovascular: Negative.  Negative for palpitations.   Gastrointestinal:  Positive for nausea. Negative for vomiting.   Endocrine: Negative.  Negative for cold intolerance.   Genitourinary: Negative.  Negative for dysuria, frequency and urgency.   Musculoskeletal:  Negative for back pain, gait problem, myalgias, neck pain and neck stiffness.   Skin: Negative.  Negative for rash.   Allergic/Immunologic: Negative.    Neurological:  Positive for dizziness, light-headedness and headaches (daily but severity varies). Negative for tremors, seizures, syncope, facial asymmetry, speech difficulty, weakness and numbness.   Hematological: Negative.  Does not bruise/bleed easily.   Psychiatric/Behavioral: Negative.  Negative for confusion, hallucinations and sleep disturbance.    All other systems reviewed and are negative.

## 2024-07-17 PROBLEM — G43.709 CHRONIC MIGRAINE WITHOUT AURA WITHOUT STATUS MIGRAINOSUS, NOT INTRACTABLE: Status: ACTIVE | Noted: 2024-07-17

## 2024-07-18 NOTE — ASSESSMENT & PLAN NOTE
Patient stated for about 12 years she has had headache/migraines but recently the headaches have become more frequent over the past 3 years or so.  They are now occurring on a daily basis.  She will use Advil which will help eliminate the headache but then it comes back once the medication wears off.  Her headaches do not appear to be positional in nature. She has been taking ibuprofen on a daily basis for awhile now.  Could have a component of medication overuse headache.   Workup:  Due to increased frequency and severity of headaches and migraines I recommend further evaluation with MRI brain without contrast to rule out structural or treatable causes of symptoms   Preventative:  We discussed headache hygiene and lifestyle factors that may improve headaches  Start Topamax 25mg nightly for one week,then increase to 50mg nightly for 1 week, then increase to 75mg nightly for 1 week, then continue at 100mg nightly   Past/ failed/contraindicated: none  Future options: Propranolol, amitriptyline, CGRP med, botox  Acute:  Discussed not taking over-the-counter or prescription pain medications more than 3 days per week to prevent medication overuse/rebound headache  Start Imitrex 100mg at the earliest onset of a migraine.  May repeat again in 2 hours if not completely headache free. No more than 2 tabs in 24 hours  Past/ failed/contraindicated: Naproxen (for carpal tunnel in past but was effective)  Future options:  Other triptan (Maxalt), ubrelvy, reyvow, nurtec

## 2024-07-24 ENCOUNTER — HOSPITAL ENCOUNTER (OUTPATIENT)
Dept: MRI IMAGING | Facility: HOSPITAL | Age: 44
Discharge: HOME | End: 2024-07-24
Payer: COMMERCIAL

## 2024-07-24 DIAGNOSIS — R20.2 PARESTHESIA: ICD-10-CM

## 2024-07-24 DIAGNOSIS — G43.709 CHRONIC MIGRAINE WITHOUT AURA WITHOUT STATUS MIGRAINOSUS, NOT INTRACTABLE: ICD-10-CM

## 2024-07-24 PROCEDURE — 70551 MRI BRAIN STEM W/O DYE: CPT

## 2024-08-12 ENCOUNTER — TELEPHONE (OUTPATIENT)
Dept: NEUROLOGY | Facility: CLINIC | Age: 44
End: 2024-08-12

## 2024-08-12 NOTE — TELEPHONE ENCOUNTER
Received via [facility] request for prescription for Topiramate.  Request scanned into [employer].

## 2024-08-19 NOTE — PROGRESS NOTES
Assessment        Diagnoses and all orders for this visit:    Encntr for gyn exam (general) (routine) w abnormal findings    Cervical cancer screening  -     Liquid-based pap, screening    Mammogram declined    Recurrent vaginitis  -     VAGINOSIS DNA PROBE             Plan      All questions answered.  Await pap smear results.  Contraception: tubal ligation.  Discussed healthy lifestyle modifications.  Educational material distributed.  Follow up in 1 year.  Follow up as needed.   PAP  Declines mammogram  AFFIRM for recurrent BV  Declines treatment for recurrent BV - Metrogel did not work, boric acid did not work for 2 months        Subjective      Lee Isaac is a 44 y.o. female who presents for annual exam.      Chief Complaint   Patient presents with    Gynecologic Exam     She reports itching, recurrent BV  No odor        Last Pap: 22  Last mammogram: n/a  Colonoscopy:  normal per patient, no need for additional tests    Current contraception: tubal ligation  History of abnormal Pap smear: yes  History of abnormal mammogram: no  Family history of uterine or ovarian cancer: no  Family history of breast cancer: no  Family history of colon cancer: no    OB History    Para Term  AB Living   1 1 1 0 0 1   SAB IAB Ectopic Multiple Live Births   0 0 0 0 1      # Outcome Date GA Lbr Paul/2nd Weight Sex Type Anes PTL Lv   1 Term  40w0d    CS-Unspec   ZAINAB       Menstrual History:  OB History          1    Para   1    Term   1       0    AB   0    Living   1         SAB   0    IAB   0    Ectopic   0    Multiple   0    Live Births   1                Menarche age: 11  Patient's last menstrual period was 2024.             Past Medical History:   Diagnosis Date    Abnormal Pap smear of cervix     Carpal tunnel syndrome     Endometriosis     GERD (gastroesophageal reflux disease)     Hypothyroidism 2017    Measles     Migraine     Varicella     Vitiligo      Past  Surgical History:   Procedure Laterality Date     SECTION      COLPOSCOPY      TOOTH EXTRACTION      TUBAL LIGATION       No family history on file.    Social History     Tobacco Use    Smoking status: Never    Smokeless tobacco: Never   Vaping Use    Vaping status: Never Used   Substance Use Topics    Alcohol use: No    Drug use: No          Current Outpatient Medications:     Ascorbic Acid (VITAMIN C) 100 MG tablet, Take 100 mg by mouth daily, Disp: , Rfl:     Cholecalciferol (VITAMIN D) 50 MCG (2000 UT) CAPS, Take by mouth, Disp: , Rfl:     COLLAGEN PO, Take by mouth, Disp: , Rfl:     cyanocobalamin (VITAMIN B-12) 100 mcg tablet, Take by mouth daily, Disp: , Rfl:     magnesium 30 MG tablet, Take 30 mg by mouth 2 (two) times a day, Disp: , Rfl:     metroNIDAZOLE (METROGEL) 0.75 % vaginal gel, Insert 1 Application into the vagina 2 (two) times a week Insert 1 applicatorful PV 2x/week, Disp: 70 g, Rfl: 3    multivitamin (THERAGRAN) TABS, Take 1 tablet by mouth daily, Disp: , Rfl:     Omega-3 Fatty Acids (FISH OIL PO), Take by mouth, Disp: , Rfl:     albuterol (PROVENTIL HFA,VENTOLIN HFA) 90 mcg/act inhaler, Inhale 2 puffs every 6 (six) hours as needed for wheezing (Patient not taking: Reported on 2019), Disp: 1 Inhaler, Rfl: 0    ibuprofen (MOTRIN) 200 mg tablet, Take 200 mg by mouth every 6 (six) hours as needed (Patient not taking: Reported on 2024), Disp: , Rfl:     metroNIDAZOLE (METROGEL) 0.75 % vaginal gel, Insert 1 application into the vagina daily at bedtime Insert one application nightly for 5 nights, then twice weekly for 4-6 months (Patient not taking: Reported on 6/3/2024), Disp: 70 g, Rfl: 3    naproxen (NAPROSYN) 500 mg tablet, Take 1 tablet by mouth as needed (Patient not taking: Reported on 2024), Disp: , Rfl:     SUMAtriptan (IMITREX) 100 mg tablet, Take 1 tablet (100 mg total) by mouth as needed for migraine May repeat in 2 hours. Limit of 2/day or 18/month, Disp: 18 tablet,  "Rfl: 2    topiramate (Topamax) 25 mg tablet, Take 25mg nightly for 1 week, then increase to 50mg nightly for 1 week, then increase to 75mg nightly for 1 week, then continue at 100mg nightly. (Patient not taking: Reported on 8/20/2024), Disp: 120 tablet, Rfl: 0    No Known Allergies        Review of Systems   Constitutional: Negative.    HENT: Negative.     Eyes: Negative.    Respiratory: Negative.     Cardiovascular: Negative.    Gastrointestinal: Negative.    Endocrine: Negative.    Genitourinary:         As noted in HPI   Musculoskeletal: Negative.    Skin: Negative.    Allergic/Immunologic: Negative.    Neurological: Negative.    Hematological: Negative.    Psychiatric/Behavioral: Negative.     All other systems reviewed and are negative.      BP 94/68   Pulse 80   Ht 5' 8\" (1.727 m)   Wt 65.3 kg (144 lb)   LMP 08/02/2024   BMI 21.90 kg/m²         Physical Exam  Constitutional:       Appearance: She is well-developed.   Genitourinary:      Vulva, bladder and rectum normal.      No lesions in the vagina.      Genitourinary Comments:         Right Labia: No rash, tenderness, lesions, skin changes or Bartholin's cyst.     Left Labia: No tenderness, lesions, skin changes, Bartholin's cyst or rash.     No inguinal adenopathy present in the right or left side.     No vaginal discharge, tenderness or bleeding.      No vaginal prolapse present.     No vaginal atrophy present.       Right Adnexa: not tender, not full and no mass present.     Left Adnexa: not tender, not full and no mass present.     No cervical motion tenderness, friability, lesion or polyp.      Uterus is not enlarged or tender.      Pelvic exam was performed with patient in the lithotomy position.   Rectum:      No external hemorrhoid.   Breasts:     Right: No mass, nipple discharge, skin change or tenderness.      Left: No mass, nipple discharge, skin change or tenderness.   HENT:      Head: Normocephalic.      Nose: Nose normal.   Eyes:      " Conjunctiva/sclera: Conjunctivae normal.   Neck:      Thyroid: No thyromegaly.   Cardiovascular:      Rate and Rhythm: Normal rate and regular rhythm.      Heart sounds: Normal heart sounds. No murmur heard.  Pulmonary:      Effort: Pulmonary effort is normal. No respiratory distress.      Breath sounds: Normal breath sounds. No wheezing or rales.   Abdominal:      General: There is no distension.      Palpations: Abdomen is soft. There is no mass.      Tenderness: There is no abdominal tenderness. There is no guarding or rebound.   Musculoskeletal:         General: No tenderness.      Cervical back: Neck supple. No muscular tenderness.   Lymphadenopathy:      Cervical: No cervical adenopathy.      Lower Body: No right inguinal adenopathy. No left inguinal adenopathy.   Neurological:      Mental Status: She is alert and oriented to person, place, and time.   Skin:     General: Skin is warm and dry.   Psychiatric:         Mood and Affect: Mood normal.         Behavior: Behavior normal.   Vitals and nursing note reviewed. Exam conducted with a chaperone present.             Future Appointments   Date Time Provider Department Center   9/16/2024  8:30 AM [facility]   12/9/2024  9:00 AM Leslie Fisher PA-C [facility]

## 2024-08-19 NOTE — PATIENT INSTRUCTIONS
"Patient Education     Routine physical for adults   The Basics   Written by the doctors and editors at [facility]   What is a physical? -- A physical is a routine visit, or \"check-up,\" with your doctor. You might also hear it called a \"wellness visit\" or \"preventive visit.\"  During each visit, the doctor will:   Ask about your physical and mental health   Ask about your habits, behaviors, and lifestyle   Do an exam   Give you vaccines if needed   Talk to you about any medicines you take   Give advice about your health   Answer your questions  Getting regular check-ups is an important part of taking care of your health. It can help your doctor find and treat any problems you have. But it's also important for preventing health problems.  A routine physical is different from a \"sick visit.\" A sick visit is when you see a doctor because of a health concern or problem. Since physicals are scheduled ahead of time, you can think about what you want to ask the doctor.  How often should I get a physical? -- It depends on your age and health. In general, for people age 21 years and older:   If you are younger than 50 years, you might be able to get a physical every 3 years.   If you are 50 years or older, your doctor might recommend a physical every year.  If you have an ongoing health condition, like diabetes or high blood pressure, your doctor will probably want to see you more often.  What happens during a physical? -- In general, each visit will include:   Physical exam - The doctor or nurse will check your height, weight, heart rate, and blood pressure. They will also look at your eyes and ears. They will ask about how you are feeling and whether you have any symptoms that bother you.   Medicines - It's a good idea to bring a list of all the medicines you take to each doctor visit. Your doctor will talk to you about your medicines and answer any questions. Tell them if you are having any side effects that bother you. You " "should also tell them if you are having trouble paying for any of your medicines.   Habits and behaviors - This includes:   Your diet   Your exercise habits   Whether you smoke, drink alcohol, or use drugs   Whether you are sexually active   Whether you feel safe at home  Your doctor will talk to you about things you can do to improve your health and lower your risk of health problems. They will also offer help and support. For example, if you want to quit smoking, they can give you advice and might prescribe medicines. If you want to improve your diet or get more physical activity, they can help you with this, too.   Lab tests, if needed - The tests you get will depend on your age and situation. For example, your doctor might want to check your:   Cholesterol   Blood sugar   Iron level   Vaccines - The recommended vaccines will depend on your age, health, and what vaccines you already had. Vaccines are very important because they can prevent certain serious or deadly infections.   Discussion of screening - \"Screening\" means checking for diseases or other health problems before they cause symptoms. Your doctor can recommend screening based on your age, risk, and preferences. This might include tests to check for:   Cancer, such as breast, prostate, cervical, ovarian, colorectal, prostate, lung, or skin cancer   Sexually transmitted infections, such as chlamydia and gonorrhea   Mental health conditions like depression and anxiety  Your doctor will talk to you about the different types of screening tests. They can help you decide which screenings to have. They can also explain what the results might mean.   Answering questions - The physical is a good time to ask the doctor or nurse questions about your health. If needed, they can refer you to other doctors or specialists, too.  Adults older than 65 years often need other care, too. As you get older, your doctor will talk to you about:   How to prevent falling at " home   Hearing or vision tests   Memory testing   How to take your medicines safely   Making sure that you have the help and support you need at home  All topics are updated as new evidence becomes available and our peer review process is complete.  This topic retrieved from [facility] on: May 02, 2024.  Topic 799340 Version 1.0  Release: 32.4.3 - C32.122  © 2024 UpToDate, Inc. and/or its affiliates. All rights reserved.  Consumer Information Use and Disclaimer   Disclaimer: This generalized information is a limited summary of diagnosis, treatment, and/or medication information. It is not meant to be comprehensive and should be used as a tool to help the user understand and/or assess potential diagnostic and treatment options. It does NOT include all information about conditions, treatments, medications, side effects, or risks that may apply to a specific patient. It is not intended to be medical advice or a substitute for the medical advice, diagnosis, or treatment of a health care provider based on the health care provider's examination and assessment of a patient's specific and unique circumstances. Patients must speak with a health care provider for complete information about their health, medical questions, and treatment options, including any risks or benefits regarding use of medications. This information does not endorse any treatments or medications as safe, effective, or approved for treating a specific patient. UpToDate, Inc. and its affiliates disclaim any warranty or liability relating to this information or the use thereof.The use of this information is governed by the Terms of Use, available at https://www.woltersShoeboxuwer.com/en/know/clinical-effectiveness-terms. 2024© UpToDate, Inc. and its affiliates and/or licensors. All rights reserved.  Copyright   © 2024 UpToDate, Inc. and/or its affiliates. All rights reserved.

## 2024-08-20 ENCOUNTER — ANNUAL EXAM (OUTPATIENT)
Dept: OBGYN CLINIC | Facility: CLINIC | Age: 44
End: 2024-08-20
Payer: COMMERCIAL

## 2024-08-20 VITALS
HEIGHT: 68 IN | WEIGHT: 144 LBS | DIASTOLIC BLOOD PRESSURE: 68 MMHG | HEART RATE: 80 BPM | SYSTOLIC BLOOD PRESSURE: 94 MMHG | BODY MASS INDEX: 21.82 KG/M2

## 2024-08-20 DIAGNOSIS — Z53.20 MAMMOGRAM DECLINED: ICD-10-CM

## 2024-08-20 DIAGNOSIS — Z01.411 ENCNTR FOR GYN EXAM (GENERAL) (ROUTINE) W ABNORMAL FINDINGS: Primary | ICD-10-CM

## 2024-08-20 DIAGNOSIS — Z12.4 CERVICAL CANCER SCREENING: ICD-10-CM

## 2024-08-20 DIAGNOSIS — N76.0 RECURRENT VAGINITIS: ICD-10-CM

## 2024-08-20 PROBLEM — J02.9 SORE THROAT: Status: RESOLVED | Noted: 2018-03-13 | Resolved: 2024-08-20

## 2024-08-20 PROBLEM — R10.2 PELVIC CRAMPING: Status: RESOLVED | Noted: 2018-04-23 | Resolved: 2024-08-20

## 2024-08-20 PROCEDURE — 87480 CANDIDA DNA DIR PROBE: CPT | Performed by: OBSTETRICS & GYNECOLOGY

## 2024-08-20 PROCEDURE — 87510 GARDNER VAG DNA DIR PROBE: CPT | Performed by: OBSTETRICS & GYNECOLOGY

## 2024-08-20 PROCEDURE — 87660 TRICHOMONAS VAGIN DIR PROBE: CPT | Performed by: OBSTETRICS & GYNECOLOGY

## 2024-08-20 PROCEDURE — G0145 SCR C/V CYTO,THINLAYER,RESCR: HCPCS | Performed by: OBSTETRICS & GYNECOLOGY

## 2024-08-20 PROCEDURE — G0476 HPV COMBO ASSAY CA SCREEN: HCPCS | Performed by: OBSTETRICS & GYNECOLOGY

## 2024-08-20 PROCEDURE — S0612 ANNUAL GYNECOLOGICAL EXAMINA: HCPCS | Performed by: OBSTETRICS & GYNECOLOGY

## 2024-08-21 ENCOUNTER — TELEPHONE (OUTPATIENT)
Dept: OBGYN CLINIC | Facility: CLINIC | Age: 44
End: 2024-08-21

## 2024-08-21 DIAGNOSIS — N76.0 RECURRENT VAGINITIS: Primary | ICD-10-CM

## 2024-08-21 LAB
CANDIDA RRNA VAG QL PROBE: NOT DETECTED
G VAGINALIS RRNA GENITAL QL PROBE: DETECTED
HPV HR 12 DNA CVX QL NAA+PROBE: NEGATIVE
HPV16 DNA CVX QL NAA+PROBE: NEGATIVE
HPV18 DNA CVX QL NAA+PROBE: NEGATIVE
T VAGINALIS RRNA GENITAL QL PROBE: NOT DETECTED

## 2024-08-21 RX ORDER — METRONIDAZOLE 500 MG/1
500 TABLET ORAL EVERY 12 HOURS SCHEDULED
Qty: 28 TABLET | Refills: 0 | Status: SHIPPED | OUTPATIENT
Start: 2024-08-21 | End: 2024-09-04

## 2024-08-21 NOTE — TELEPHONE ENCOUNTER
----- Message from Tatiana RUELAS sent at 8/21/2024  3:42 PM EDT -----  Patient called in and was notified of her results, pt stating that she would like to take the antibiotics, pt confirmed pharmacy on file. Please order for pt

## 2024-08-22 ENCOUNTER — TELEPHONE (OUTPATIENT)
Age: 44
End: 2024-08-22

## 2024-08-22 NOTE — TELEPHONE ENCOUNTER
----- Message from Linda Dobson MD sent at 8/21/2024  3:53 PM EDT -----  Rx sent for FLAGYL/oral metronidazole x 2 weeks  ----- Message -----  From: Tatiana Styles RN  Sent: 8/21/2024   3:42 PM EDT  To: Linda Dobson MD    Patient called in and was notified of her results, pt stating that she would like to take the antibiotics, pt confirmed pharmacy on file. Please order for pt

## 2024-08-22 NOTE — TELEPHONE ENCOUNTER
Patient was notified that medication was sent to her pharmacy, pt reporting that she has already picked up the prescription, no further questions at this time.

## 2024-08-23 LAB
LAB AP GYN PRIMARY INTERPRETATION: NORMAL
Lab: NORMAL

## 2024-09-19 ENCOUNTER — TELEPHONE (OUTPATIENT)
Dept: NEUROLOGY | Facility: CLINIC | Age: 44
End: 2024-09-19

## 2024-09-21 NOTE — PATIENT INSTRUCTIONS
Patient Instructions:    Additional Testing  -No additional Neurodiagnostic workup required at this time  Headache/migraine treatment:     Prevention  -To take every day to help prevent headaches - not to take at the time of headache:  -Over the counter preventive supplements for headaches/migraines (if you try, try for 3 months straight):  -Magnesium 400mg daily (If any diarrhea or upset stomach, decrease dose as tolerated)  -Riboflavin (Vitamin B2) 400mg daily (may make your urine bright/neon yellow)  - All supplements can be purchased online    Abortive  -For immediate treatment of a headache/migraine  -For your more moderate to severe migraines take this medication as early as possible:  -It is ok to take ibuprofen, acetaminophen or naproxen (Advil, Tylenol,  Aleve, Excedrin) if they help your headaches you should limit these to No more than 3 times a week to avoid medication overuse/rebound headaches.     Lifestyle Recommendations:    -Remain well-hydrated drinking at least 48 to 64 ounces of noncaffeinated beverages per day in addition to anything caffeinated.    -Getting adequate rest is also very important for migraine prevention (aim for 7-8 hours per night).     -Regular exercise is also beneficial for headache prevention.  I would encourage at the least 5 days of physical exercise weekly for at least 30 minutes.   -I would like for them to keep track of their migraines using an application on their phone or calendar as they see fit. Phone applications: Migraine Donny or Migraine Diary.    Education and Follow-up:    -Please call or send a [employer] message with any questions or concerns. Please present to the emergency room with any concerning symptoms such as: worst headache of your life, sudden painless loss of vision or double vision, difficulty speaking or swallowing, vertigo/room spinning that does not quickly resolve, or weakness/numbness/loss of coordination affecting 1 side of the face or  body.  -Follow up as needed

## 2024-09-21 NOTE — PROGRESS NOTES
[facility]  PATIENT:  Lee Isaac  MRN:  [id number]  :  1980  DATE OF SERVICE:  2024  REFERRED BY: No ref. provider found  PCP: No primary care provider on file.    Assessment/Plan:     Chronic migraine without aura without status migrainosus, not intractable  Since her last office visit, she stopped taking the Topamax because of side effects. Imitrex was not effective for her.  There was concern of medication overuse headache at her last office visit when she was using ibuprofen on an almost daily basis.  She actively worked to decrease the amount of ibuprofen she was taking- decreased to every other day and then stopped it completely.  She now only uses it very sparingly when needed for headache or wrist pain (patient has carpal tunnel).  She noticed significant improvement since decreasing her ibuprofen use.  Last visit she was having almost daily headaches now she is experiencing about 1/week.  She is not interested in starting any medications preventatively or abortively at this time she has the list of over-the-counter supplementation and is currently using magnesium on a daily basis.  We reviewed her MRI of the brain which came back without any structural abnormalities that could be causing her symptoms.  She reports that her mother experienced migraine headaches and we discussed that it is likely related to her physiology and genetic predisposition.  I encouraged her to follow-up at any time if she would like to discuss her options in the future.  Workup:  No additional workup required at this juncture  Preventative:  We discussed headache hygiene and lifestyle factors that may improve headaches  Discussed over-the-counter supplements including magnesium and B2 at 400 mg each daily.  Patient currently taking magnesium on a daily basis  Past/ failed/contraindicated: Topamax (side effects)  Future options: Propranolol, amitriptyline, CGRP med, botox  Acute:  Discussed not  taking over-the-counter or prescription pain medications more than 3 days per week to prevent medication overuse/rebound headache  Past/ failed/contraindicated: Naproxen (for carpal tunnel in past but was effective), Imitrex (ineffective)  Future options:  Other triptan (Maxalt), ubrelvy, reyvow, nurtec       Diagnoses and all orders for this visit:    Chronic migraine without aura without status migrainosus, not intractable           Patient should follow up on an as needed basis or I would be happy to see her sooner if needed.  Patient should call the office or send a [facility] message with any questions or concerns.  Patient should present to the nearest emergency department with any concerning symptoms.     CC:     We had the pleasure of evaluating Walkiris in neurological follow-up today. she is a 44 y.o. year-old female who presents today for evaluation of headaches.     History obtained from patient as well as available medical record review.    History of Present Illness:     Consult with me 7/16/24: Patient stated for about 12 years she has had headache/migraines but recently the headaches have become more frequent over the past 3 years or so.  They are now occurring on a daily basis.  She will use Advil which will help eliminate the headache but then it comes back once the medication wears off.  Her headaches do not appear to be positional in nature.  She has been taking ibuprofen on a daily basis for awhile now.  Could have a component of medication overuse headache.     Interval history as of 9/23/24  Since her last office visit, she stopped taking the Topamax because of side effects. Imitrex was not effective for her.  There was concern of medication overuse headache at her last office visit when she was using ibuprofen on an almost daily basis.  She actively worked to decrease the amount of ibuprofen she was taking- decreased to every other day and then stopped it completely.  She now only uses it very  sparingly when needed for headache or wrist pain (patient has carpal tunnel).  She noticed significant improvement since decreasing her ibuprofen use.  Last visit she was having almost daily headaches now she is experiencing about 1/week.  She is not interested in starting any medications preventatively or abortively at this time she has the list of over-the-counter supplementation and is currently using magnesium on a daily basis.  We reviewed her MRI of the brain which came back without any structural abnormalities that could be causing her symptoms.  She reports that her mother experienced migraine headaches and we discussed that it is likely related to her physiology and genetic predisposition.    Headaches started at what age? 32 years old  How often do the headaches occur? Last visit was daily headache. Will take some advil but then the headaches can progress throughout the day.  Now,   What time of the day do the headaches start?  No particular time of day   How long do the headaches last? Hours to entire day  Are you ever headache free? Yes, rarely.    Aura? without aura     Last eye exam: about 1 year ago she is due for one.     Where is your headache located and pain quality? Throbbing, pressure, dull, achy sometimes just on the left side.  Other days it will start in the neck and will radiate up.   Also has tingling at the vertex of her head which started about 4 months ago.      What is the intensity of pain? Average: 3-5/10, worst 10/10  Associated symptoms:   [x] Nausea       [] Vomiting        [] Diarrhea  [] Insomnia    [] Stiff or sore neck   [x] Problems with concentration  [x] Photophobia     [x]Phonophobia      [] Osmophobia  [] Blurred vision   [] Prefer quiet, dark room  [x] Light-headed or dizzy     [x] Tinnitus   [] Hands or feet tingle or feel numb/paresthesias    [] Ptosis      [] Facial droop  [] Lacrimation  [] Nasal congestion/rhinorrhea   [] Flushing of face    Things that make the  headache worse? Any movement makes it worse.     Headache triggers: Stress, Chewing, exercise, fatigue, menstruation. Not aware of any foods or drinks.     Have you seen someone else for headaches or pain? No  Have you had trigger point injection performed and how often? No  Have you had Botox injection performed and how often? No   Have you had epidural injections or transforaminal injections performed? No  Are you current pregnant or planning on getting pregnant? No, tubal ligation  Have you ever had any Brain imaging? no    LIFESTYLE  Sleep   Averages: 3-4 hours per night  Problems falling asleep?:   Yes  Problems staying asleep?:  Yes  Do you snore while asleep? No  Do you wake up with headaches? No  Water: 1 gallon per day when not traveling for work.   Caffeine: 1 k-cup per day  Mood: Not currently but has in the past.     The following portions of the patient's history were reviewed and updated as appropriate: allergies, current medications, past family history, past medical history, past social history, past surgical history and problem list.    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   chiropractic care    Past Medical History:     Past Medical History:   Diagnosis Date     Abnormal Pap smear of cervix 2009    Carpal tunnel syndrome     Endometriosis 2009    GERD (gastroesophageal reflux disease)     Hypothyroidism 2017    Measles     Migraine     Varicella     Vitiligo      Past Surgical History:   Procedure Laterality Date     SECTION      COLPOSCOPY      TOOTH EXTRACTION      TUBAL LIGATION       Patient Active Problem List   Diagnosis    Bacterial vaginosis    Chronic migraine without aura without status migrainosus, not intractable    Mammogram declined     Medications:     Current Outpatient Medications   Medication Sig Dispense Refill    Ascorbic Acid (VITAMIN C) 100 MG tablet Take 100 mg by mouth daily      Cholecalciferol (VITAMIN D) 50 MCG ( UT) CAPS Take by mouth      COLLAGEN PO Take by mouth      cyanocobalamin (VITAMIN B-12) 100 mcg tablet Take by mouth daily      magnesium 30 MG tablet Take 30 mg by mouth 2 (two) times a day      metroNIDAZOLE (METROGEL) 0.75 % vaginal gel Insert 1 Application into the vagina 2 (two) times a week Insert 1 applicatorful PV 2x/week 70 g 3    multivitamin (THERAGRAN) TABS Take 1 tablet by mouth daily      Omega-3 Fatty Acids (FISH OIL PO) Take by mouth      ibuprofen (MOTRIN) 200 mg tablet Take 200 mg by mouth every 6 (six) hours as needed (Patient not taking: Reported on 2024)      metroNIDAZOLE (METROGEL) 0.75 % vaginal gel Insert 1 application into the vagina daily at bedtime Insert one application nightly for 5 nights, then twice weekly for 4-6 months (Patient not taking: Reported on 6/3/2024) 70 g 3     No current facility-administered medications for this visit.        Allergies:     No Known Allergies    Family History:     Family History   Problem Relation Age of Onset    No Known Problems Mother        Social History:     Social History     Socioeconomic History    Marital status: /Civil Union     Spouse name: Not on file    Number of children: Not on file    Years of education: Not on file    Highest  "education level: Not on file   Occupational History    Not on file   Tobacco Use    Smoking status: Never    Smokeless tobacco: Never   Vaping Use    Vaping status: Never Used   Substance and Sexual Activity    Alcohol use: No    Drug use: No    Sexual activity: Yes     Partners: Male     Birth control/protection: Female Sterilization   Other Topics Concern    Not on file   Social History Narrative    Not on file     Social Determinants of Health     Financial Resource Strain: Not on file   Food Insecurity: Not on file   Transportation Needs: Not on file   Physical Activity: Not on file   Stress: Not on file   Social Connections: Not on file   Intimate Partner Violence: Not on file   Housing Stability: Not on file     Objective:     Physical Exam:    Vitals:  /53 (BP Location: Left arm, Patient Position: Sitting, Cuff Size: Standard)   Pulse 71   Ht 5' 8\" (1.727 m)   Wt 67.2 kg (148 lb 3.2 oz)   BMI 22.53 kg/m²   BP Readings from Last 3 Encounters:   24 105/53   24 94/68   24 116/62     Pulse Readings from Last 3 Encounters:   24 71   24 80   24 76     On neurological examination the patient was awake, alert, attentive, oriented to person, place, and time. Recent and remote memory intact to conversation with no evidence of language dysfunction. Satisfactory fund of knowledge. Normal attention span and concentration.  Mood, affect and judgement are appropriate. Speech is fluent without dysarthria or aphasia. Face appears symmetric, with no obvious weakness noted.  Audition is intact to casual conversation.  Eye movements are intact. Able to move bilateral upper extremities antigravity without difficulty.      Pertinent lab results:   Lab Results   Component Value Date    GAR0IVHRCLJQ 1.649 2024     Lab Results   Component Value Date    FYIRNUNQ65 966 (H) 2024    Vitamin D  57.2  24     Pertinent Imagin24 MRI Brain: No acute infarct, mass effect " or midline shift.      Review of Systems:     Constitutional:  Negative for appetite change, fatigue and fever.   HENT: Negative.  Negative for hearing loss, tinnitus, trouble swallowing and voice change.    Eyes: Negative.  Negative for photophobia, pain and visual disturbance.   Respiratory: Negative.  Negative for shortness of breath.    Cardiovascular: Negative.  Negative for palpitations.   Gastrointestinal: Negative.  Negative for nausea and vomiting.   Endocrine: Negative.  Negative for cold intolerance.   Genitourinary: Negative.  Negative for dysuria, frequency and urgency.   Musculoskeletal:  Negative for back pain, gait problem, myalgias, neck pain and neck stiffness.   Skin: Negative.  Negative for rash.   Allergic/Immunologic: Negative.    Neurological:  Positive for headaches (weekly migraines- once a week). Negative for dizziness, tremors, seizures, syncope, facial asymmetry, speech difficulty, weakness, light-headedness and numbness.   Hematological: Negative.  Does not bruise/bleed easily.   Psychiatric/Behavioral:  Positive for sleep disturbance. Negative for confusion and hallucinations.     Reviewed ROS as entered by medical assistant.     I have spent a total time of 20 minutes on 09/23/24 in caring for this patient including Diagnostic results, Prognosis, Risks and benefits of tx options, Instructions for management, Patient and family education, Importance of tx compliance, Risk factor reductions, Impressions, Counseling / Coordination of care, Documenting in the medical record, Reviewing / ordering tests, medicine, procedures  , and Obtaining or reviewing history  .       Author:  CLAUDIA Acosta 9/23/2024 3:11 PM

## 2024-09-23 ENCOUNTER — OFFICE VISIT (OUTPATIENT)
Dept: NEUROLOGY | Facility: CLINIC | Age: 44
End: 2024-09-23
Payer: COMMERCIAL

## 2024-09-23 VITALS
DIASTOLIC BLOOD PRESSURE: 53 MMHG | WEIGHT: 148.2 LBS | SYSTOLIC BLOOD PRESSURE: 105 MMHG | BODY MASS INDEX: 22.46 KG/M2 | HEIGHT: 68 IN | HEART RATE: 71 BPM

## 2024-09-23 DIAGNOSIS — G43.709 CHRONIC MIGRAINE WITHOUT AURA WITHOUT STATUS MIGRAINOSUS, NOT INTRACTABLE: Primary | ICD-10-CM

## 2024-09-23 PROCEDURE — 99213 OFFICE O/P EST LOW 20 MIN: CPT

## 2024-09-23 NOTE — PROGRESS NOTES
Review of Systems   Constitutional:  Negative for appetite change, fatigue and fever.   HENT: Negative.  Negative for hearing loss, tinnitus, trouble swallowing and voice change.    Eyes: Negative.  Negative for photophobia, pain and visual disturbance.   Respiratory: Negative.  Negative for shortness of breath.    Cardiovascular: Negative.  Negative for palpitations.   Gastrointestinal: Negative.  Negative for nausea and vomiting.   Endocrine: Negative.  Negative for cold intolerance.   Genitourinary: Negative.  Negative for dysuria, frequency and urgency.   Musculoskeletal:  Negative for back pain, gait problem, myalgias, neck pain and neck stiffness.   Skin: Negative.  Negative for rash.   Allergic/Immunologic: Negative.    Neurological:  Positive for headaches (weekly migraines- once a week). Negative for dizziness, tremors, seizures, syncope, facial asymmetry, speech difficulty, weakness, light-headedness and numbness.   Hematological: Negative.  Does not bruise/bleed easily.   Psychiatric/Behavioral:  Positive for sleep disturbance. Negative for confusion and hallucinations.

## 2024-09-23 NOTE — ASSESSMENT & PLAN NOTE
Since her last office visit, she stopped taking the Topamax because of side effects. Imitrex was not effective for her.  There was concern of medication overuse headache at her last office visit when she was using ibuprofen on an almost daily basis.  She actively worked to decrease the amount of ibuprofen she was taking- decreased to every other day and then stopped it completely.  She now only uses it very sparingly when needed for headache or wrist pain (patient has carpal tunnel).  She noticed significant improvement since decreasing her ibuprofen use.  Last visit she was having almost daily headaches now she is experiencing about 1/week.  She is not interested in starting any medications preventatively or abortively at this time she has the list of over-the-counter supplementation and is currently using magnesium on a daily basis.  We reviewed her MRI of the brain which came back without any structural abnormalities that could be causing her symptoms.  She reports that her mother experienced migraine headaches and we discussed that it is likely related to her physiology and genetic predisposition.  I encouraged her to follow-up at any time if she would like to discuss her options in the future.  Workup:  No additional workup required at this juncture  Preventative:  We discussed headache hygiene and lifestyle factors that may improve headaches  Discussed over-the-counter supplements including magnesium and B2 at 400 mg each daily.  Patient currently taking magnesium on a daily basis  Past/ failed/contraindicated: Topamax (side effects)  Future options: Propranolol, amitriptyline, CGRP med, botox  Acute:  Discussed not taking over-the-counter or prescription pain medications more than 3 days per week to prevent medication overuse/rebound headache  Past/ failed/contraindicated: Naproxen (for carpal tunnel in past but was effective), Imitrex (ineffective)  Future options:  Other triptan (Maxalt), ubrelvy, reyvow,  nurtec

## 2025-07-01 ENCOUNTER — OFFICE VISIT (OUTPATIENT)
Dept: URGENT CARE | Facility: MEDICAL CENTER | Age: 45
End: 2025-07-01
Payer: COMMERCIAL

## 2025-07-01 VITALS
HEART RATE: 75 BPM | DIASTOLIC BLOOD PRESSURE: 64 MMHG | RESPIRATION RATE: 18 BRPM | OXYGEN SATURATION: 98 % | SYSTOLIC BLOOD PRESSURE: 98 MMHG | TEMPERATURE: 97.8 F

## 2025-07-01 DIAGNOSIS — B34.9 VIRAL INFECTION: Primary | ICD-10-CM

## 2025-07-01 PROCEDURE — 99203 OFFICE O/P NEW LOW 30 MIN: CPT | Performed by: NURSE PRACTITIONER

## 2025-07-01 NOTE — PATIENT INSTRUCTIONS
"Aleve cold and sinus for symptom management     Patient Education     Cough, runny nose, and the common cold   The Basics   Written by the doctors and editors at [facility]   What causes cough, runny nose, and other symptoms of the common cold? -- These symptoms are usually caused by a virus. Doctors also use the term \"viral upper respiratory infection\" or \"viral URI.\" Lots of different viruses can get into your nose, mouth, throat, or airways and cause cold symptoms.  Most people get better from a cold without any lasting problems. Even so, having a cold can be uncomfortable.  What are the symptoms of the common cold? -- Symptoms can include:   Sneezing   Coughing   Sniffling and runny nose   Sore throat   Chest congestion  In children, the common cold can also cause a fever. But adults do not usually get a fever when they have a cold.  Colds usually last about 3 to 7 days in adults and 10 days in children. But some people have symptoms for up to 2 weeks.  How can I tell if I have a cold or something else? -- Sometimes, it can be hard to tell if you have a cold or something else. Some cold symptoms can also be caused by other illnesses, such as COVID-19, the flu, or strep throat.  There are sometimes clues that can help you tell the difference:   COVID-19 often starts out very similar to a cold, although it can also cause a fever. If you have cold symptoms and have been around someone with COVID-19, you should get a test to find out if you have it, too.   The flu is more likely to cause fever, body aches, and extreme tiredness than a cold.   Strep throat usually causes severe throat pain. It can also cause a fever and swollen glands in the neck. People with strep throat usually do not have other cold symptoms like a stuffy nose or cough.  If you think that you might have an illness other than the common cold, call your doctor or nurse. They can tell you what to do.  Can medicine help with a cold? -- Usually, a cold " gets better on its own and does not need treatment. Because colds are usually caused by viruses, antibiotics will not help.  If you are a teen or an adult, you can try cough and cold medicines that you can get without a prescription. These medicines might help with your symptoms. But they can't cure your cold, or help you get well faster.  If you decide to try non-prescription cold medicines:   Read the directions on the label, and follow them carefully.   Do not combine 2 or more medicines that have acetaminophen in them. If you take too much acetaminophen, it can damage your liver.   If you have a heart condition, have high blood pressure, or take any prescription medicines, talk to your doctor or pharmacist before taking cold medicine. They can tell you which medicines are safe.  Some medicines are not safe for children:   If your child is younger than 6, do not give them any cold medicines. These medicines are not safe for young children. Even if your child is older than 6, cough and cold medicines are unlikely to help.   Never give aspirin to any child younger than 18 years old. In children, aspirin can cause a life-threatening condition called Reye syndrome.   When giving your child acetaminophen or other non-prescription medicines, never give more than the recommended dose.  Is there anything I can do on my own to feel better? -- Yes. You can:   Get plenty of rest.   Drink lots of fluids (water, juice, or broth) to stay hydrated. This will help replace any fluids lost if you have a runny nose or sweating from a fever. Warm tea or soup can help soothe a sore throat.   If the air in your home feels dry, use a cool-mist humidifier. This can help a stuffy nose and make it easier to breathe.   Use saline nose drops or spray to relieve stuffiness.   Avoid smoking, and stay away from places where people are smoking.  Can the common cold lead to more serious problems? -- In some cases, yes. In some people, having a  cold can lead to:   Ear infections   Worse asthma symptoms   Sinus infections   Pneumonia or bronchitis (infections of the lungs)  Can colds be prevented? -- There are some things you can do to keep germs from spreading:   Wash your hands with soap and water often (figure 1) - This can also help prevent the spread of other illnesses like the flu and COVID-19.   Cover your cough - Cough into your elbow instead of your hands. Teach children to do this, too. Throw away used tissues right away.   Clean surfaces - The germs that cause the common cold can live on tables, door handles, and other surfaces for at least 2 hours.   Stay home if you are sick - When you do need to be around other people, consider wearing a face mask until you are feeling better.  When should I call the doctor? -- Contact your doctor or nurse if you:   Lose your sense of taste or smell   Have a fever of more than 100.4°F (38°C) that comes with shaking chills, loss of appetite, or trouble breathing   Have a very bad sore throat   Have a fever and also have lung disease, such as emphysema or asthma   Have a cough that lasts longer than 10 days or starts getting worse   Have chest pain when you cough or breathe deeply, have trouble breathing, or cough up blood  If you are older than 65, or if you have any chronic medical conditions such as diabetes, contact your doctor or nurse any time you get a long-lasting cough.  Take your child to the emergency department if they:   Become confused or stop responding to you   Have trouble breathing or have to work hard to breathe  Contact your child's doctor or nurse if the child:   Loses their sense of taste or smell or won't eat foods that they ate before   Has a very bad sore throat   Refuses to drink anything for a long time   Is younger than 4 months   Has a fever and is not acting like themselves   Has a cough that lasts for more than 2 weeks and is not getting any better or is getting worse   Has a  stuffed or runny nose that gets worse or does not get any better after 10 days   Has red eyes or yellow goop coming out of their eyes   Has ear pain, pulls at their ears, or shows other signs of having an ear infection  All topics are updated as new evidence becomes available and our peer review process is complete.  This topic retrieved from [employer] on: Feb 26, 2024.  Topic 34265 Version 30.0  Release: 32.2.4 - C32.56  © 2024 UpToDate, Inc. and/or its affiliates. All rights reserved.  figure 1: How to wash your hands     Wet your hands with clean water, and apply a small amount of soap. Lather and rub hands together for at least 20 seconds. Clean your wrists, palms, backs of your hands, between your fingers, tips of your fingers, thumbs, and under and around your nails. Rinse well, and dry your hands using a clean towel.  Graphic 517094 Version 7.0  Consumer Information Use and Disclaimer   Disclaimer: This generalized information is a limited summary of diagnosis, treatment, and/or medication information. It is not meant to be comprehensive and should be used as a tool to help the user understand and/or assess potential diagnostic and treatment options. It does NOT include all information about conditions, treatments, medications, side effects, or risks that may apply to a specific patient. It is not intended to be medical advice or a substitute for the medical advice, diagnosis, or treatment of a health care provider based on the health care provider's examination and assessment of a patient's specific and unique circumstances. Patients must speak with a health care provider for complete information about their health, medical questions, and treatment options, including any risks or benefits regarding use of medications. This information does not endorse any treatments or medications as safe, effective, or approved for treating a specific patient. UpToDate, Inc. and its affiliates disclaim any warranty or  liability relating to this information or the use thereof.The use of this information is governed by the Terms of Use, available at https://www.wolterskluwer.com/en/know/clinical-effectiveness-terms. 2024© UpToDate, Inc. and its affiliates and/or licensors. All rights reserved.  Copyright   © 2024 [employer] and/or its affiliates. All rights reserved.

## 2025-07-01 NOTE — PROGRESS NOTES
[facility]        NAME: Lee Isaac is a 45 y.o. female  : 1980    MRN: [id number]  DATE: 2025  TIME: 9:45 AM    Assessment and Plan   Viral infection [B34.9]  1. Viral infection          Discussed currently day 6 of 7 of symptoms with a negative at-home COVID test earlier on.  Do not recommend retesting at this point.  Discussed viral in etiology.  Offered prednisone patient declines.  Discussed Aleve cold and sinus.  Declines work note.  Discussed she is afebrile so according to CDC she is able to be at work.  Patient verbalized understanding    Patient Instructions     Follow up with PCP in 3-5 days.  Proceed to  ER if symptoms worsen.    Chief Complaint     Chief Complaint   Patient presents with    Cold Like Symptoms     Pt c/o cold like symptoms x 6 days. Reports head congestion and headache. Tried Dayquil and Tamiflu with no relief.          History of Present Illness   Lee Isaac presents to the clinic c/o    Patient with complaints of sore throat for few days followed by nasal congestion followed by cough.  Has a family gathering this evening and was wondering if she has COVID or not.  She states she did not home test when symptoms started at which time it was negative.  She is currently day 7 of symptoms.  Taking DayQuil and NyQuil.  NyQuil does help her sleep however DayQuil does not give her any relief during the day.        Review of Systems   Review of Systems   All other systems reviewed and are negative.        Current Medications     Long-Term Medications[1]    Current Allergies     Allergies as of 2025    (No Known Allergies)            The following portions of the patient's history were reviewed and updated as appropriate: allergies, current medications, past family history, past medical history, past social history, past surgical history and problem list.    Objective   BP 98/64   Pulse 75   Temp 97.8 °F (36.6 °C)   Resp 18   SpO2 98%        Physical  Exam     Physical Exam  Vitals and nursing note reviewed.   Constitutional:       Appearance: Normal appearance. She is well-developed.   HENT:      Head: Normocephalic and atraumatic.      Right Ear: Hearing, tympanic membrane, ear canal and external ear normal.      Left Ear: Hearing, tympanic membrane, ear canal and external ear normal.      Nose: Mucosal edema and congestion present.      Right Sinus: Maxillary sinus tenderness and frontal sinus tenderness present.      Left Sinus: Maxillary sinus tenderness and frontal sinus tenderness present.      Mouth/Throat:      Lips: Pink.      Mouth: Mucous membranes are moist.      Pharynx: Oropharynx is clear.     Eyes:      General: Lids are normal.      Conjunctiva/sclera: Conjunctivae normal.      Pupils: Pupils are equal, round, and reactive to light.       Cardiovascular:      Rate and Rhythm: Normal rate and regular rhythm.      Heart sounds: Normal heart sounds, S1 normal and S2 normal.   Pulmonary:      Effort: Pulmonary effort is normal.      Breath sounds: Normal breath sounds.   Abdominal:      General: Abdomen is flat. Bowel sounds are normal.      Palpations: Abdomen is soft.     Musculoskeletal:         General: Normal range of motion.      Cervical back: Full passive range of motion without pain, normal range of motion and neck supple.     Skin:     General: Skin is warm and dry.     Neurological:      General: No focal deficit present.      Mental Status: She is alert and oriented to person, place, and time.     Psychiatric:         Mood and Affect: Mood normal.         Speech: Speech normal.         Behavior: Behavior normal. Behavior is cooperative.         Thought Content: Thought content normal.         Judgment: Judgment normal.                          [1]   Long-Term Medications   Medication Sig Dispense Refill    Ascorbic Acid (VITAMIN C) 100 MG tablet Take 100 mg by mouth daily      Cholecalciferol (VITAMIN D) 50 MCG (2000 UT) CAPS Take by  mouth      cyanocobalamin (VITAMIN B-12) 100 mcg tablet Take by mouth daily      ibuprofen (MOTRIN) 200 mg tablet Take 200 mg by mouth every 6 (six) hours as needed (Patient not taking: Reported on 8/20/2024)      magnesium 30 MG tablet Take 30 mg by mouth 2 (two) times a day      multivitamin (THERAGRAN) TABS Take 1 tablet by mouth daily

## 2025-08-12 ENCOUNTER — NURSE TRIAGE (OUTPATIENT)
Age: 45
End: 2025-08-12

## 2025-08-18 ENCOUNTER — TELEPHONE (OUTPATIENT)
Dept: OBGYN CLINIC | Facility: CLINIC | Age: 45
End: 2025-08-18